# Patient Record
Sex: FEMALE
[De-identification: names, ages, dates, MRNs, and addresses within clinical notes are randomized per-mention and may not be internally consistent; named-entity substitution may affect disease eponyms.]

---

## 2017-02-01 ENCOUNTER — LAB SERVICES (OUTPATIENT)
Dept: OTHER | Age: 47
End: 2017-02-01

## 2017-02-01 ENCOUNTER — CHARTING TRANS (OUTPATIENT)
Dept: OTHER | Age: 47
End: 2017-02-01

## 2017-02-01 LAB
APPEARANCE: NORMAL
BILIRUBIN: NEGATIVE
COLOR: YELLOW
GLUCOSE U: NEGATIVE
KETONES: NEGATIVE
LEUKOCYTE ESTERASE: NORMAL
LEUKOCYTES: NORMAL
NITRITE: NEGATIVE
OCCULT BLOOD: NORMAL
PH: 6
PROTEIN: NEGATIVE
URINE SPEC GRAVITY: 1
UROBILINOGEN: 0.2

## 2017-02-06 LAB — BACTERIA UR CULT: NORMAL

## 2017-07-24 ENCOUNTER — OFFICE VISIT (OUTPATIENT)
Dept: OBGYN CLINIC | Facility: CLINIC | Age: 47
End: 2017-07-24

## 2017-07-24 VITALS
SYSTOLIC BLOOD PRESSURE: 112 MMHG | DIASTOLIC BLOOD PRESSURE: 74 MMHG | WEIGHT: 152 LBS | BODY MASS INDEX: 23.86 KG/M2 | HEIGHT: 67 IN

## 2017-07-24 DIAGNOSIS — Z01.419 WOMEN'S ANNUAL ROUTINE GYNECOLOGICAL EXAMINATION: Primary | ICD-10-CM

## 2017-07-24 PROBLEM — Z86.010 HISTORY OF COLON POLYPS: Status: ACTIVE | Noted: 2017-07-24

## 2017-07-24 PROBLEM — Z86.0100 HISTORY OF COLON POLYPS: Status: ACTIVE | Noted: 2017-07-24

## 2017-07-24 PROCEDURE — 88175 CYTOPATH C/V AUTO FLUID REDO: CPT | Performed by: OBSTETRICS & GYNECOLOGY

## 2017-07-24 PROCEDURE — 87624 HPV HI-RISK TYP POOLED RSLT: CPT | Performed by: OBSTETRICS & GYNECOLOGY

## 2017-07-24 PROCEDURE — 99396 PREV VISIT EST AGE 40-64: CPT | Performed by: OBSTETRICS & GYNECOLOGY

## 2017-07-24 RX ORDER — LEVOTHYROXINE, LIOTHYRONINE 19; 4.5 UG/1; UG/1
TABLET ORAL
Refills: 1 | COMMUNITY
Start: 2017-06-26 | End: 2018-02-09

## 2017-07-24 NOTE — PROGRESS NOTES
Renny Pee is here for a checkup. I have not seen her since . She is 41-year-old  4 para . Patient says that her periods are very regular at 24 days apart lasting for 4 days.   Her last Pap smear in  was normal she never had abnormal Pap history.     Social History       Social History  Social History   Marital status:   Spouse name: N/A    Years of education: N/A  Number of children: N/A     Occupational History  None on file     Social History Main Topics   Smoking status: Never Sm

## 2017-07-25 LAB — HPV I/H RISK 1 DNA SPEC QL NAA+PROBE: NEGATIVE

## 2017-09-22 ENCOUNTER — TELEPHONE (OUTPATIENT)
Dept: OBGYN CLINIC | Facility: CLINIC | Age: 47
End: 2017-09-22

## 2017-09-22 DIAGNOSIS — R92.8 ABNORMAL MAMMOGRAM: Primary | ICD-10-CM

## 2017-09-22 NOTE — TELEPHONE ENCOUNTER
Mammogram screening report stated that pt with calcifications in right breast and additional imaging is necessary. Order sent for diagnostic mammo of right breast to  breast center.

## 2017-10-03 ENCOUNTER — TELEPHONE (OUTPATIENT)
Dept: OBGYN CLINIC | Facility: CLINIC | Age: 47
End: 2017-10-03

## 2017-10-03 NOTE — TELEPHONE ENCOUNTER
LM for pt to contact office re: diagnostic mammogram results. Pt with some calcifications and 6 month f/u mammogram recommended with magnification views.

## 2017-10-03 NOTE — TELEPHONE ENCOUNTER
Spoke to pt and let her know that 6 month f/u mammogram is necessary for calcifications found. She voiced understanding. Placed in [address] file.

## 2018-01-12 ENCOUNTER — OFFICE VISIT (OUTPATIENT)
Dept: FAMILY MEDICINE CLINIC | Facility: CLINIC | Age: 48
End: 2018-01-12

## 2018-01-12 VITALS
TEMPERATURE: 98 F | WEIGHT: 149 LBS | OXYGEN SATURATION: 98 % | HEART RATE: 76 BPM | DIASTOLIC BLOOD PRESSURE: 78 MMHG | HEIGHT: 66 IN | SYSTOLIC BLOOD PRESSURE: 98 MMHG | BODY MASS INDEX: 23.95 KG/M2 | RESPIRATION RATE: 17 BRPM

## 2018-01-12 DIAGNOSIS — Z80.0 FAMILY HISTORY OF COLON CANCER: ICD-10-CM

## 2018-01-12 DIAGNOSIS — E01.0 THYROMEGALY: ICD-10-CM

## 2018-01-12 DIAGNOSIS — Z86.010 HISTORY OF COLON POLYPS: ICD-10-CM

## 2018-01-12 DIAGNOSIS — E06.3 HASHIMOTO'S THYROIDITIS: ICD-10-CM

## 2018-01-12 DIAGNOSIS — Z00.00 ROUTINE MEDICAL EXAM: Primary | ICD-10-CM

## 2018-01-12 PROCEDURE — 99396 PREV VISIT EST AGE 40-64: CPT | Performed by: FAMILY MEDICINE

## 2018-01-16 ENCOUNTER — MED REC SCAN ONLY (OUTPATIENT)
Dept: FAMILY MEDICINE CLINIC | Facility: CLINIC | Age: 48
End: 2018-01-16

## 2018-01-17 ENCOUNTER — HOSPITAL ENCOUNTER (OUTPATIENT)
Dept: ULTRASOUND IMAGING | Age: 48
Discharge: HOME | End: 2018-01-17
Attending: FAMILY MEDICINE
Payer: COMMERCIAL

## 2018-01-17 ENCOUNTER — LAB ENCOUNTER (OUTPATIENT)
Dept: LAB | Facility: REFERENCE LAB | Age: 48
End: 2018-01-17
Attending: FAMILY MEDICINE
Payer: COMMERCIAL

## 2018-01-17 DIAGNOSIS — E01.0 THYROMEGALY: ICD-10-CM

## 2018-01-17 DIAGNOSIS — E06.3 HASHIMOTO'S THYROIDITIS: ICD-10-CM

## 2018-01-17 DIAGNOSIS — Z00.00 ROUTINE MEDICAL EXAM: ICD-10-CM

## 2018-01-17 LAB
ALBUMIN SERPL BCP-MCNC: 4.2 G/DL (ref 3.5–4.8)
ALBUMIN/GLOB SERPL: 1.5 {RATIO} (ref 1–2)
ALP SERPL-CCNC: 40 U/L (ref 32–100)
ALT SERPL-CCNC: 16 U/L (ref 14–54)
ANION GAP SERPL CALC-SCNC: 9 MMOL/L (ref 0–18)
AST SERPL-CCNC: 23 U/L (ref 15–41)
BASOPHILS # BLD: 0 K/UL (ref 0–0.2)
BASOPHILS NFR BLD: 1 %
BILIRUB SERPL-MCNC: 1 MG/DL (ref 0.3–1.2)
BUN SERPL-MCNC: 16 MG/DL (ref 8–20)
BUN/CREAT SERPL: 20 (ref 10–20)
CALCIUM SERPL-MCNC: 9.3 MG/DL (ref 8.5–10.5)
CHLORIDE SERPL-SCNC: 105 MMOL/L (ref 95–110)
CHOLEST SERPL-MCNC: 156 MG/DL (ref 110–200)
CO2 SERPL-SCNC: 24 MMOL/L (ref 22–32)
CREAT SERPL-MCNC: 0.8 MG/DL (ref 0.5–1.5)
DHEA-S SERPL-MCNC: 146.4 UG/DL (ref 20–266)
EOSINOPHIL # BLD: 0.1 K/UL (ref 0–0.7)
EOSINOPHIL NFR BLD: 3 %
ERYTHROCYTE [DISTWIDTH] IN BLOOD BY AUTOMATED COUNT: 13.6 % (ref 11–15)
FOLATE SERPL-MCNC: 20.1 NG/ML
GLOBULIN PLAS-MCNC: 2.8 G/DL (ref 2.5–3.7)
GLUCOSE SERPL-MCNC: 87 MG/DL (ref 70–99)
HBA1C MFR BLD: 5.3 % (ref 4–6)
HCT VFR BLD AUTO: 43.6 % (ref 35–48)
HDLC SERPL-MCNC: 79 MG/DL
HGB BLD-MCNC: 14.2 G/DL (ref 12–16)
LDLC SERPL CALC-MCNC: 70 MG/DL (ref 0–99)
LYMPHOCYTES # BLD: 1.2 K/UL (ref 1–4)
LYMPHOCYTES NFR BLD: 27 %
MAGNESIUM SERPL-MCNC: 2 MG/DL (ref 1.8–2.5)
MCH RBC QN AUTO: 30 PG (ref 27–32)
MCHC RBC AUTO-ENTMCNC: 32.6 G/DL (ref 32–37)
MCV RBC AUTO: 92 FL (ref 80–100)
MONOCYTES # BLD: 0.3 K/UL (ref 0–1)
MONOCYTES NFR BLD: 7 %
NEUTROPHILS # BLD AUTO: 3 K/UL (ref 1.8–7.7)
NEUTROPHILS NFR BLD: 63 %
NONHDLC SERPL-MCNC: 77 MG/DL
OSMOLALITY UR CALC.SUM OF ELEC: 287 MOSM/KG (ref 275–295)
PLATELET # BLD AUTO: 197 K/UL (ref 140–400)
PMV BLD AUTO: 8.6 FL (ref 7.4–10.3)
POTASSIUM SERPL-SCNC: 4.1 MMOL/L (ref 3.3–5.1)
PROT SERPL-MCNC: 7 G/DL (ref 5.9–8.4)
RBC # BLD AUTO: 4.74 M/UL (ref 3.7–5.4)
SODIUM SERPL-SCNC: 138 MMOL/L (ref 136–144)
T3FREE SERPL-MCNC: 3.43 PG/ML (ref 2.53–4.29)
T4 FREE SERPL-MCNC: 0.78 NG/DL (ref 0.58–1.64)
THYROPEROXIDASE AB SERPL-ACNC: 1608 IU/ML (ref 0–9)
TRIGL SERPL-MCNC: 34 MG/DL (ref 1–149)
TSH SERPL-ACNC: 1.56 UIU/ML (ref 0.45–5.33)
VIT B12 SERPL-MCNC: 815 PG/ML (ref 181–914)
WBC # BLD AUTO: 4.7 K/UL (ref 4–11)

## 2018-01-17 PROCEDURE — 82306 VITAMIN D 25 HYDROXY: CPT | Performed by: FAMILY MEDICINE

## 2018-01-17 PROCEDURE — 84630 ASSAY OF ZINC: CPT | Performed by: FAMILY MEDICINE

## 2018-01-17 PROCEDURE — 84481 FREE ASSAY (FT-3): CPT | Performed by: FAMILY MEDICINE

## 2018-01-17 PROCEDURE — 84207 ASSAY OF VITAMIN B-6: CPT | Performed by: FAMILY MEDICINE

## 2018-01-17 PROCEDURE — 86376 MICROSOMAL ANTIBODY EACH: CPT | Performed by: FAMILY MEDICINE

## 2018-01-17 PROCEDURE — 86800 THYROGLOBULIN ANTIBODY: CPT | Performed by: FAMILY MEDICINE

## 2018-01-17 PROCEDURE — 86628 CANDIDA ANTIBODY: CPT | Performed by: FAMILY MEDICINE

## 2018-01-17 PROCEDURE — 80050 GENERAL HEALTH PANEL: CPT | Performed by: FAMILY MEDICINE

## 2018-01-17 PROCEDURE — 83735 ASSAY OF MAGNESIUM: CPT | Performed by: FAMILY MEDICINE

## 2018-01-17 PROCEDURE — 82607 VITAMIN B-12: CPT | Performed by: FAMILY MEDICINE

## 2018-01-17 PROCEDURE — 80061 LIPID PANEL: CPT | Performed by: FAMILY MEDICINE

## 2018-01-17 PROCEDURE — 76536 US EXAM OF HEAD AND NECK: CPT | Performed by: FAMILY MEDICINE

## 2018-01-17 PROCEDURE — 84439 ASSAY OF FREE THYROXINE: CPT | Performed by: FAMILY MEDICINE

## 2018-01-17 PROCEDURE — 82746 ASSAY OF FOLIC ACID SERUM: CPT | Performed by: FAMILY MEDICINE

## 2018-01-17 PROCEDURE — 83036 HEMOGLOBIN GLYCOSYLATED A1C: CPT | Performed by: FAMILY MEDICINE

## 2018-01-17 PROCEDURE — 84482 T3 REVERSE: CPT | Performed by: FAMILY MEDICINE

## 2018-01-17 PROCEDURE — 82627 DEHYDROEPIANDROSTERONE: CPT | Performed by: FAMILY MEDICINE

## 2018-01-17 PROCEDURE — 36415 COLL VENOUS BLD VENIPUNCTURE: CPT | Performed by: FAMILY MEDICINE

## 2018-01-18 LAB — THYROGLOBULIN AB: 84.8 IU/ML

## 2018-01-19 LAB
25(OH)D3 SERPL-MCNC: 46.3 NG/ML
ZINC: 81 UG/DL

## 2018-01-20 LAB — VITAMIN B6: 58.8 NMOL/L

## 2018-01-21 LAB
CANDIDA ANTIBODY IGA: 0.89 EV
CANDIDA ANTIBODY IGG: 0.62 EV
CANDIDA ANTIBODY IGM: 0.91 EV

## 2018-02-01 LAB — TRIIODOTHYRONINE, REVERSE: 12.4 NG/DL

## 2018-02-09 ENCOUNTER — OFFICE VISIT (OUTPATIENT)
Dept: FAMILY MEDICINE CLINIC | Facility: CLINIC | Age: 48
End: 2018-02-09

## 2018-02-09 VITALS
SYSTOLIC BLOOD PRESSURE: 98 MMHG | HEART RATE: 87 BPM | DIASTOLIC BLOOD PRESSURE: 60 MMHG | HEIGHT: 66 IN | OXYGEN SATURATION: 97 % | TEMPERATURE: 98 F | WEIGHT: 146 LBS | RESPIRATION RATE: 16 BRPM | BODY MASS INDEX: 23.46 KG/M2

## 2018-02-09 DIAGNOSIS — E06.3 HASHIMOTO'S THYROIDITIS: Primary | ICD-10-CM

## 2018-02-09 DIAGNOSIS — B37.9 CANDIDIASIS: ICD-10-CM

## 2018-02-09 PROCEDURE — 99214 OFFICE O/P EST MOD 30 MIN: CPT | Performed by: FAMILY MEDICINE

## 2018-02-09 RX ORDER — LEVOTHYROXINE AND LIOTHYRONINE 38; 9 UG/1; UG/1
120 TABLET ORAL DAILY
Qty: 180 TABLET | Refills: 0 | Status: SHIPPED | OUTPATIENT
Start: 2018-02-09 | End: 2018-05-07

## 2018-02-09 NOTE — PATIENT INSTRUCTIONS
The products and items listed below (the “Products”)  and their claims have not been evaluated by the Food and Drug Administration. Dietary products are not intended to treat, prevent, mitigate or cure disease.  Ultimately, you must draw your own conclusion 1. Start by looking at your diet. Feel free to slowly make changes to the diet, for example making a significant change in the diet every week or two. Take on one meal type or one food type at a time.  The ultimate goal is to eat 80-90% of the food from the Description: NOW® Caprylic Acid is a naturally derived nutrient also known as octanoic acid. Caprylic Acid is a medium chain fatty acid (MCT) that is naturally found in coconut and palm kernel oil.   Caprylic Acid may contribute to support a healthy digest Milk  Cream  Whey isolate  MOLDY NUTS & SEEDS  Peanuts  Cashews  Pistachios  CONDIMENTS  Barbecue sauce  Horseradish  Ketchup  Mayonnaise  Soy sauce  White vinegar  REFINED/PROCESSED FATS & OILS  Canola oil  Fake ‘butter’ spreads  Margarine  Soybean oil  S These are all very nutritious fruits that contain very few natural sugars. When buying olives, be sure to buy the properly fermented kind, not the ones that are simply pickled in white vinegar.   Grains And Pseudo-Grains  Grains and pseudo-grains like buckw Nuts like almond and hazelnut are healthy and have a low mold content. Any residual mold can be removed by soaking them in a diluted grapefruit seed extract solution for a few hours.  Coconut is always a great option on the Candida diet, whether is comes in There are lots of herbal teas that have antifungal properties, and in fact you can drink any herbal tea on the Candida diet. Just make sure that it is not caffeinated.   Chicory root is also a great prebiotic (it contains 20% Inulin), so it can help to repo You should aim to eliminate caffeine completely, but there are other options if that’s proving impossible for you. Decaf coffee still contains small amounts of caffeine, but look for decaf that is made by the Swiss water process.  Green tea is also lower in Many people don’t realize that the grain varieties we eat today are very different from those that our ancestors enjoyed. Today’s varieties have increased yields, but they also have much more gluten than was present before.  Our digestive systems are simply Whey isolate is another dairy food that should be avoided. The processing used to produce whey isolates produces damaging D optical isomers, which are not designed to be in your body.  When these D proteins end up in your bone, brain and muscle, they have b Refined and processed vegetable oils are generally bad news for your health, and you should avoid them while on this anti-Candida diet. There are plenty of excellent oils to choose from, without resorting to these over-processed vegetable derivatives.  Also I already mentioned fruit juices in the fruits section – these should be avoided because of the way that they affect your blood sugar. Also steer yourself away from sugary soft drinks, and diet drinks that contain aspartame or sucralose.  Energy drinks tend

## 2018-02-09 NOTE — PROGRESS NOTES
Clearance Led is a 52year old female. Patient presents with:  Lab Results      HPI:   Here for follow up. Has started the Restore and doing well so far. Taking her NP thyroid 2 pills twice daily.   Feels worse over the past week because she ran Temp: 98 °F (36.7 °C)   TempSrc: Oral   SpO2: 97%   Weight: 146 lb   Height: 66\"       Physical Exam   Constitutional: She is oriented to person, place, and time and well-developed, well-nourished, and in no distress.    HENT:   Head: Normocephalic and atr The products and items listed below (the “Products”)  and their claims have not been evaluated by the Food and Drug Administration. Dietary products are not intended to treat, prevent, mitigate or cure disease.  Ultimately, you must draw your own conclusion 1. Start by looking at your diet. Feel free to slowly make changes to the diet, for example making a significant change in the diet every week or two. Take on one meal type or one food type at a time.  The ultimate goal is to eat 80-90% of the food from the Description: NOW® Caprylic Acid is a naturally derived nutrient also known as octanoic acid. Caprylic Acid is a medium chain fatty acid (MCT) that is naturally found in coconut and palm kernel oil.   Caprylic Acid may contribute to support a healthy digest Milk  Cream  Whey isolate  MOLDY NUTS & SEEDS  Peanuts  Cashews  Pistachios  CONDIMENTS  Barbecue sauce  Horseradish  Ketchup  Mayonnaise  Soy sauce  White vinegar  REFINED/PROCESSED FATS & OILS  Canola oil  Fake ‘butter’ spreads  Margarine  Soybean oil  S These are all very nutritious fruits that contain very few natural sugars. When buying olives, be sure to buy the properly fermented kind, not the ones that are simply pickled in white vinegar.   Grains And Pseudo-Grains  Grains and pseudo-grains like buckw Nuts like almond and hazelnut are healthy and have a low mold content. Any residual mold can be removed by soaking them in a diluted grapefruit seed extract solution for a few hours.  Coconut is always a great option on the Candida diet, whether is comes in There are lots of herbal teas that have antifungal properties, and in fact you can drink any herbal tea on the Candida diet. Just make sure that it is not caffeinated.   Chicory root is also a great prebiotic (it contains 20% Inulin), so it can help to repo You should aim to eliminate caffeine completely, but there are other options if that’s proving impossible for you. Decaf coffee still contains small amounts of caffeine, but look for decaf that is made by the Swiss water process.  Green tea is also lower in Many people don’t realize that the grain varieties we eat today are very different from those that our ancestors enjoyed. Today’s varieties have increased yields, but they also have much more gluten than was present before.  Our digestive systems are simply Whey isolate is another dairy food that should be avoided. The processing used to produce whey isolates produces damaging D optical isomers, which are not designed to be in your body.  When these D proteins end up in your bone, brain and muscle, they have b Refined and processed vegetable oils are generally bad news for your health, and you should avoid them while on this anti-Candida diet. There are plenty of excellent oils to choose from, without resorting to these over-processed vegetable derivatives.  Also I already mentioned fruit juices in the fruits section – these should be avoided because of the way that they affect your blood sugar. Also steer yourself away from sugary soft drinks, and diet drinks that contain aspartame or sucralose.  Energy drinks tend

## 2018-02-19 ENCOUNTER — TELEPHONE (OUTPATIENT)
Dept: OBGYN CLINIC | Facility: CLINIC | Age: 48
End: 2018-02-19

## 2018-02-19 DIAGNOSIS — R92.1 BREAST CALCIFICATIONS: Primary | ICD-10-CM

## 2018-02-19 NOTE — TELEPHONE ENCOUNTER
Per sony, pt is due for 6 month f/u mammogram for breast calcifications. Order placed and faxed to  breast center. LM for pt on her personal VM.

## 2018-02-21 DIAGNOSIS — R92.1 BREAST CALCIFICATION SEEN ON MAMMOGRAM: Primary | ICD-10-CM

## 2018-02-21 NOTE — PROGRESS NOTES
Orders done in [facility] and faxed to [facility] @ [phone]

## 2018-05-07 RX ORDER — LEVOTHYROXINE, LIOTHYRONINE 38; 9 UG/1; UG/1
120 TABLET ORAL DAILY
Qty: 180 TABLET | Refills: 0 | Status: SHIPPED | OUTPATIENT
Start: 2018-05-07 | End: 2018-08-30

## 2018-08-22 ENCOUNTER — TELEPHONE (OUTPATIENT)
Dept: OBGYN CLINIC | Facility: CLINIC | Age: 48
End: 2018-08-22

## 2018-08-22 ENCOUNTER — TELEPHONE (OUTPATIENT)
Dept: FAMILY MEDICINE CLINIC | Facility: CLINIC | Age: 48
End: 2018-08-22

## 2018-08-22 DIAGNOSIS — E06.3 HASHIMOTO'S THYROIDITIS: Primary | ICD-10-CM

## 2018-08-22 DIAGNOSIS — R92.1 BREAST CALCIFICATIONS: Primary | ICD-10-CM

## 2018-08-23 NOTE — TELEPHONE ENCOUNTER
I am putting the orders in  I have a 3pm in [facility] available on September 4th if she would like to get in sooner to discuss this.

## 2018-08-23 NOTE — TELEPHONE ENCOUNTER
She is due for a f/u office visit. Please call to schedule and I will have those put in to get done prior to her visit.

## 2018-08-23 NOTE — TELEPHONE ENCOUNTER
Fatigue can be caused by a multitude of factors so I prefer to have follow up so we discuss thoroughly and evaluate all aspects that may be contributing to this. Thyroid lab orders are in.

## 2018-08-27 ENCOUNTER — TELEPHONE (OUTPATIENT)
Dept: FAMILY MEDICINE CLINIC | Facility: CLINIC | Age: 48
End: 2018-08-27

## 2018-08-27 NOTE — TELEPHONE ENCOUNTER
Spoke to pt and she stated that she needs a refill on her NP Thyroid 60mg, 2 tabs daily. Let pt know that msg will be sent to Dr. Colonel Chambers for ok. Pt with f/u appt on 10/5/18.

## 2018-08-29 RX ORDER — THYROID 60 MG/1
120 TABLET ORAL DAILY
Qty: 180 TABLET | Refills: 0 | Status: CANCELLED
Start: 2018-08-29

## 2018-08-30 ENCOUNTER — TELEPHONE (OUTPATIENT)
Dept: OBGYN CLINIC | Facility: CLINIC | Age: 48
End: 2018-08-30

## 2018-08-30 NOTE — TELEPHONE ENCOUNTER
Spoke with pt and refilled pt's med.  Pt stated initially she was planning on having thyroid panel drawn prior to med refill but pt states she did not see orders in her mychart and pt didn't receive call back that labs were ordered nor that she wasn't able

## 2018-08-30 NOTE — TELEPHONE ENCOUNTER
Please look in her chart. All the labs are ordered and ready for her to do and you can let her know that this can be done at any time.

## 2018-09-03 NOTE — TELEPHONE ENCOUNTER
From: Edward Botello  Sent: 8/29/2018 4:30 PM CDT  Subject: Medication Renewal Request    Brigido Botello would like a refill of the following medications:     NP THYROID 60 MG Oral Tab Kin DO Kely]    Preferred pharmacy: [address] [phone], [phone]

## 2018-09-07 ENCOUNTER — TELEPHONE (OUTPATIENT)
Dept: OBGYN CLINIC | Facility: CLINIC | Age: 48
End: 2018-09-07

## 2018-09-17 ENCOUNTER — TELEPHONE (OUTPATIENT)
Dept: OBGYN CLINIC | Facility: CLINIC | Age: 48
End: 2018-09-17

## 2018-09-17 DIAGNOSIS — R92.1 BREAST CALCIFICATION SEEN ON MAMMOGRAM: Primary | ICD-10-CM

## 2018-09-17 NOTE — TELEPHONE ENCOUNTER
Fax rec'd from  breast center that pt requires a right stereotactic breast biopsy d/t calcifications. Order placed and sent to . Pt with appt on 9/19/18.

## 2018-09-18 ENCOUNTER — TELEPHONE (OUTPATIENT)
Dept: OBGYN CLINIC | Facility: CLINIC | Age: 48
End: 2018-09-18

## 2018-09-18 NOTE — TELEPHONE ENCOUNTER
Mandie Rodriguez had mammogram at [facility] breast center on September 14. Reports shows that she has grouping of microcalcification in the upper outer quadrant of the right breast which require biopsy.     I discussed this with the patient and she already has

## 2018-09-21 ENCOUNTER — TELEPHONE (OUTPATIENT)
Dept: OBGYN CLINIC | Facility: CLINIC | Age: 48
End: 2018-09-21

## 2018-09-27 ENCOUNTER — APPOINTMENT (OUTPATIENT)
Dept: LAB | Facility: REFERENCE LAB | Age: 48
End: 2018-09-27
Attending: FAMILY MEDICINE
Payer: COMMERCIAL

## 2018-09-27 DIAGNOSIS — E06.3 HASHIMOTO'S THYROIDITIS: ICD-10-CM

## 2018-09-27 LAB
T3FREE SERPL-MCNC: 3.36 PG/ML (ref 2.53–4.29)
T4 FREE SERPL-MCNC: 0.65 NG/DL (ref 0.58–1.64)
TSH SERPL-ACNC: 5.34 UIU/ML (ref 0.45–5.33)

## 2018-09-27 PROCEDURE — 84481 FREE ASSAY (FT-3): CPT | Performed by: FAMILY MEDICINE

## 2018-09-27 PROCEDURE — 86800 THYROGLOBULIN ANTIBODY: CPT | Performed by: FAMILY MEDICINE

## 2018-09-27 PROCEDURE — 84482 T3 REVERSE: CPT | Performed by: FAMILY MEDICINE

## 2018-09-27 PROCEDURE — 36415 COLL VENOUS BLD VENIPUNCTURE: CPT | Performed by: FAMILY MEDICINE

## 2018-09-27 PROCEDURE — 84443 ASSAY THYROID STIM HORMONE: CPT | Performed by: FAMILY MEDICINE

## 2018-09-27 PROCEDURE — 84439 ASSAY OF FREE THYROXINE: CPT | Performed by: FAMILY MEDICINE

## 2018-09-27 PROCEDURE — 86376 MICROSOMAL ANTIBODY EACH: CPT | Performed by: FAMILY MEDICINE

## 2018-09-28 LAB
THYROGLOBULIN AB: 206.2 IU/ML
THYROPEROXIDASE AB SERPL-ACNC: 942.5 IU/ML (ref 0–9)

## 2018-09-30 LAB — TRIIODOTHYRONINE, REVERSE: 10.1 NG/DL

## 2018-10-02 ENCOUNTER — OFFICE VISIT (OUTPATIENT)
Dept: FAMILY MEDICINE CLINIC | Facility: CLINIC | Age: 48
End: 2018-10-02
Payer: COMMERCIAL

## 2018-10-02 ENCOUNTER — APPOINTMENT (OUTPATIENT)
Dept: LAB | Facility: REFERENCE LAB | Age: 48
End: 2018-10-02
Attending: FAMILY MEDICINE
Payer: COMMERCIAL

## 2018-10-02 VITALS
HEIGHT: 66 IN | DIASTOLIC BLOOD PRESSURE: 70 MMHG | BODY MASS INDEX: 24.11 KG/M2 | OXYGEN SATURATION: 98 % | WEIGHT: 150 LBS | SYSTOLIC BLOOD PRESSURE: 100 MMHG | HEART RATE: 76 BPM

## 2018-10-02 DIAGNOSIS — B37.9 CANDIDIASIS: ICD-10-CM

## 2018-10-02 DIAGNOSIS — R53.82 CHRONIC FATIGUE: ICD-10-CM

## 2018-10-02 DIAGNOSIS — E01.0 THYROMEGALY: ICD-10-CM

## 2018-10-02 DIAGNOSIS — E06.3 HASHIMOTO'S THYROIDITIS: Primary | ICD-10-CM

## 2018-10-02 PROCEDURE — 36415 COLL VENOUS BLD VENIPUNCTURE: CPT | Performed by: FAMILY MEDICINE

## 2018-10-02 PROCEDURE — 82627 DEHYDROEPIANDROSTERONE: CPT | Performed by: FAMILY MEDICINE

## 2018-10-02 PROCEDURE — 99214 OFFICE O/P EST MOD 30 MIN: CPT | Performed by: FAMILY MEDICINE

## 2018-10-02 PROCEDURE — 82728 ASSAY OF FERRITIN: CPT | Performed by: FAMILY MEDICINE

## 2018-10-02 PROCEDURE — 84140 ASSAY OF PREGNENOLONE: CPT | Performed by: FAMILY MEDICINE

## 2018-10-02 NOTE — PROGRESS NOTES
Agus Hannon is a 50year old female. Patient presents with:   Follow - Up: tired, med check      HPI:     Feeling very tired - started over the summer  Feeling like she needs more coffee  Drinking 24 oz of coffee per day  Sleep is erratic - will ha Substance and Sexual Activity      Alcohol use:  Yes        Alcohol/week: 0.6 oz        Types: 1 Standard drinks or equivalent per week        Comment: 1 qd      Drug use: Yes        Types: Cannabis      Sexual activity: Not on file    Other Topics      C The course of treatment for improving the balance of her microflora and to reduce the candida load in the system was discussed in full, including that this process takes at least 4-6 months or longer.  The patient was given a detailed explanation of the die Description: NOW® Caprylic Acid is a naturally derived nutrient also known as octanoic acid. Caprylic Acid is a medium chain fatty acid (MCT) that is naturally found in coconut and palm kernel oil.   Caprylic Acid may contribute to support a healthy digest · Just BREATHE! One of the easiest practices to do is to sit and pay attention to your breath. You can literally say to yourself, “I am breathing in” as you breathe in and “I am breathing out” as you breathe out. Use these phrases to focus your mind.    · Y [facility] one word to repeat out loud at first, then quieter and quieter until only mouthing the word. Then say quietly inside your head and then allow this word to pull you into complete relaxation.     Lea - Keisha Villar or

## 2018-10-02 NOTE — PATIENT INSTRUCTIONS
The products and items listed below (the “Products”)  and their claims have not been evaluated by the Food and Drug Administration. Dietary products are not intended to treat, prevent, mitigate or cure disease.  Ultimately, you must draw your own conclusion Where to buy: http://www.[employer]/  Directions: 1 capsule daily    Xymogen Brand Supplements  To sign up with [employer] in order to purchase the recommended products go to:     www. [employer]    Click on orange link in left upperhand · Gratitude can instill a sense of peace. Keep a gratitude journal and write down at least 5 things (or more!) that you are thankful for everyday. Life never seems as bad when you realize how much you have to be thankful for.    · Focus on positive things Conscious Breathing: Breathwork for Health, Stress Release, and Personal Mastery by Naveed Franco    The Miracle of Mindfulness by Boubacar Houston    Check out these resources on how meditation and deep breathing can transform your mind and body:    http:/

## 2018-10-31 ENCOUNTER — OFFICE VISIT (OUTPATIENT)
Dept: FAMILY MEDICINE CLINIC | Facility: CLINIC | Age: 48
End: 2018-10-31
Payer: COMMERCIAL

## 2018-10-31 VITALS
SYSTOLIC BLOOD PRESSURE: 110 MMHG | BODY MASS INDEX: 24.91 KG/M2 | DIASTOLIC BLOOD PRESSURE: 60 MMHG | HEART RATE: 90 BPM | OXYGEN SATURATION: 98 % | WEIGHT: 155 LBS | HEIGHT: 66 IN

## 2018-10-31 DIAGNOSIS — E01.0 THYROMEGALY: ICD-10-CM

## 2018-10-31 DIAGNOSIS — E06.3 HASHIMOTO'S THYROIDITIS: Primary | ICD-10-CM

## 2018-10-31 DIAGNOSIS — R53.82 CHRONIC FATIGUE: ICD-10-CM

## 2018-10-31 DIAGNOSIS — S39.012A STRAIN OF LUMBAR REGION, INITIAL ENCOUNTER: ICD-10-CM

## 2018-10-31 PROCEDURE — 99214 OFFICE O/P EST MOD 30 MIN: CPT | Performed by: FAMILY MEDICINE

## 2018-10-31 RX ORDER — BACLOFEN 10 MG/1
10 TABLET ORAL 3 TIMES DAILY PRN
Qty: 30 TABLET | Refills: 0 | Status: SHIPPED | OUTPATIENT
Start: 2018-10-31 | End: 2021-06-28

## 2018-10-31 NOTE — PROGRESS NOTES
Alvaro Noguera is a 50year old female. Patient presents with: Follow - Up: lab results       HPI:     Threw her back out 2 weeks ago - slept wrong and lifted something  Doing acupuncture/cupping    Started vitamin C and iron - feels a bit better. Not on file    Tobacco Use      Smoking status: Never Smoker      Smokeless tobacco: Never Used    Substance and Sexual Activity      Alcohol use:  Yes        Alcohol/week: 0.6 oz        Types: 1 Standard drinks or equivalent per week        Comment: 1 Given further recommendations as below    Orders Placed This Visit:  Orders Placed This Encounter      TSH [E]      Thyroxine, Free [E]      Thyroid Peroxidase (TPO) AB [E]      Free T3 (Triiodothryronine)      LAB RESULT SCAN    No orders of the defined t Return in about 2 months (around 12/31/2018) for Integrative Medicine - Established (30 min). Patient affirmed understanding of plan and all questions were answered.      Meri Guillaume, DO

## 2018-11-05 VITALS
HEART RATE: 87 BPM | RESPIRATION RATE: 16 BRPM | BODY MASS INDEX: 21.69 KG/M2 | WEIGHT: 135 LBS | HEIGHT: 66 IN | TEMPERATURE: 98.4 F | DIASTOLIC BLOOD PRESSURE: 60 MMHG | SYSTOLIC BLOOD PRESSURE: 100 MMHG

## 2018-11-11 PROBLEM — E01.0 THYROMEGALY: Status: ACTIVE | Noted: 2018-11-11

## 2018-11-26 RX ORDER — LEVOTHYROXINE, LIOTHYRONINE 38; 9 UG/1; UG/1
TABLET ORAL
Qty: 180 TABLET | Refills: 0 | Status: SHIPPED | OUTPATIENT
Start: 2018-11-26 | End: 2019-02-23

## 2019-01-13 ENCOUNTER — HOSPITAL ENCOUNTER (OUTPATIENT)
Age: 49
Discharge: HOME | End: 2019-01-13
Attending: EMERGENCY MEDICINE
Payer: COMMERCIAL

## 2019-01-13 ENCOUNTER — APPOINTMENT (OUTPATIENT)
Dept: GENERAL RADIOLOGY | Age: 49
End: 2019-01-13
Attending: EMERGENCY MEDICINE
Payer: COMMERCIAL

## 2019-01-13 VITALS
TEMPERATURE: 99 F | OXYGEN SATURATION: 98 % | DIASTOLIC BLOOD PRESSURE: 66 MMHG | RESPIRATION RATE: 18 BRPM | HEART RATE: 78 BPM | SYSTOLIC BLOOD PRESSURE: 110 MMHG

## 2019-01-13 DIAGNOSIS — S92.515A NONDISPLACED FRACTURE OF PROXIMAL PHALANX OF LEFT LESSER TOE(S), INITIAL ENCOUNTER FOR CLOSED FRACTURE: Primary | ICD-10-CM

## 2019-01-13 PROCEDURE — 99203 OFFICE O/P NEW LOW 30 MIN: CPT

## 2019-01-13 PROCEDURE — 99213 OFFICE O/P EST LOW 20 MIN: CPT

## 2019-01-13 PROCEDURE — 73660 X-RAY EXAM OF TOE(S): CPT | Performed by: EMERGENCY MEDICINE

## 2019-01-13 NOTE — ED PROVIDER NOTES
[address]: [address]    History   Patient presents with:  Lower Extremity Injury (musculoskeletal)    Stated Complaint: Left Foot Toe Injury     HPI    27-year-old female, no significant past medical history excep dislocation. MDM   Left fourth toe injury, rule out fracture. + midshaft fx, no sig dislocation. D/W pt. Will buddy tape and pad, where wide shoe since she does not want a cast shoe here and follow-up with primary care physician as needed.   Tylenol

## 2019-01-13 NOTE — ED INITIAL ASSESSMENT (HPI)
Pt here with complaints of left 4th toe pain, pt states she hit her toe on the side of the door and thinks it may be broken, pt sates its hard to walk

## 2019-02-25 RX ORDER — LEVOTHYROXINE, LIOTHYRONINE 38; 9 UG/1; UG/1
TABLET ORAL
Qty: 180 TABLET | Refills: 0 | Status: SHIPPED | OUTPATIENT
Start: 2019-02-25 | End: 2019-05-23

## 2019-03-11 ENCOUNTER — TELEPHONE (OUTPATIENT)
Dept: OBGYN CLINIC | Facility: CLINIC | Age: 49
End: 2019-03-11

## 2019-03-11 DIAGNOSIS — Z12.39 BREAST CANCER SCREENING: Primary | ICD-10-CM

## 2019-03-11 NOTE — TELEPHONE ENCOUNTER
Orders for  6 month diagnostic mammogram    [facility]  [phone]

## 2019-03-14 ENCOUNTER — TELEPHONE (OUTPATIENT)
Dept: OBGYN CLINIC | Facility: CLINIC | Age: 49
End: 2019-03-14

## 2019-03-14 NOTE — TELEPHONE ENCOUNTER
Spoke to Carisa [facility] at [facility], who stated that per recommendation from her mammogram done 9/19/18, pt is to have right diag mammo done. Per Carisa Hernandez, she has an order that was faxed from our office that can still be used.

## 2019-03-14 NOTE — TELEPHONE ENCOUNTER
Haley farah from [facility] ([facility]) requesting an updated order for Right Diagnostic Mammogram with possible Ultrasound.  Please fax over to  [phone]

## 2019-05-22 NOTE — H&P
[address] [facility] [address] Gastroenterology                                                                                                  Clinic History and Physical     Pa disease     Gluten sensitivity,  never confirmed with test   • Colon polyp 2006   • Hemorrhoids    • Thyroid disease       Past Surgical History:   Procedure Laterality Date   • COLONOSCOPY  2010   • PATIENT DENIES ANY SURGICAL HISTORY        Family Hx: erythema  BEHAVIOR/PSYCH:  negative for psychotic behavior      PHYSICAL EXAM:   Blood pressure 113/77, pulse 77, height 5' 6\" (1.676 m), weight 152 lb (68.9 kg).     Gen: patient appears comfortable and in no acute distress  HEENT: conjunctiva pink, the s anti-coagulants: None  -Diabetes meds: None    ** If MAC @ [facility]/NE:    - HOLD ACE/ARBs the night before and/or the day of the procedure(s) - N/A   - NO alcohol, recreational drugs nor erectile dysfunction mediations 24 hours before procedure(s)   - NO herba

## 2019-05-23 RX ORDER — LEVOTHYROXINE AND LIOTHYRONINE 38; 9 UG/1; UG/1
TABLET ORAL
Qty: 180 TABLET | Refills: 0 | Status: SHIPPED | OUTPATIENT
Start: 2019-05-23 | End: 2019-05-31

## 2019-05-23 NOTE — TELEPHONE ENCOUNTER
Please call patient to let her know that the labs are all ordered and she can do them before her appointment. Please tell her to hold her thyroid dose the day of the labs. She can take it after her labwork is done that day.

## 2019-05-23 NOTE — TELEPHONE ENCOUNTER
A refill request was received for:  Requested Prescriptions      No prescriptions requested or ordered in this encounter     Last refill date: 2/25/2019  Qty:180 tabs   Last office visit: 10/31/2018  When is follow up due: 12/31/18    No future appt schedu

## 2019-05-23 NOTE — TELEPHONE ENCOUNTER
Called pt to let her know lab orders have been placed and medication was approved. Pt understands not to take medication before lab work.

## 2019-05-23 NOTE — TELEPHONE ENCOUNTER
appt for medication refill scheduled kurtis knox on 5 /31 at 10:00 am ...... Pt wants to know if you can order bloodwork prior to her appt so that Neha Qiu can go over results with her at appointment.

## 2019-05-28 ENCOUNTER — APPOINTMENT (OUTPATIENT)
Dept: LAB | Facility: REFERENCE LAB | Age: 49
End: 2019-05-28
Attending: FAMILY MEDICINE
Payer: COMMERCIAL

## 2019-05-28 DIAGNOSIS — E06.3 HASHIMOTO'S THYROIDITIS: ICD-10-CM

## 2019-05-28 PROCEDURE — 84443 ASSAY THYROID STIM HORMONE: CPT

## 2019-05-28 PROCEDURE — 86376 MICROSOMAL ANTIBODY EACH: CPT

## 2019-05-28 PROCEDURE — 84481 FREE ASSAY (FT-3): CPT

## 2019-05-28 PROCEDURE — 36415 COLL VENOUS BLD VENIPUNCTURE: CPT

## 2019-05-28 PROCEDURE — 84439 ASSAY OF FREE THYROXINE: CPT

## 2019-05-29 ENCOUNTER — TELEPHONE (OUTPATIENT)
Dept: GASTROENTEROLOGY | Facility: CLINIC | Age: 49
End: 2019-05-29

## 2019-05-29 ENCOUNTER — OFFICE VISIT (OUTPATIENT)
Dept: GASTROENTEROLOGY | Facility: CLINIC | Age: 49
End: 2019-05-29
Payer: COMMERCIAL

## 2019-05-29 VITALS
BODY MASS INDEX: 24.43 KG/M2 | HEART RATE: 77 BPM | SYSTOLIC BLOOD PRESSURE: 113 MMHG | DIASTOLIC BLOOD PRESSURE: 77 MMHG | WEIGHT: 152 LBS | HEIGHT: 66 IN

## 2019-05-29 DIAGNOSIS — Z86.010 HISTORY OF COLON POLYPS: ICD-10-CM

## 2019-05-29 DIAGNOSIS — Z80.0 FAMILY HISTORY OF COLON CANCER: ICD-10-CM

## 2019-05-29 DIAGNOSIS — Z12.11 COLON CANCER SCREENING: Primary | ICD-10-CM

## 2019-05-29 DIAGNOSIS — Z86.010 HISTORY OF COLONIC POLYPS: ICD-10-CM

## 2019-05-29 DIAGNOSIS — Z12.11 SCREENING FOR COLON CANCER: Primary | ICD-10-CM

## 2019-05-29 PROCEDURE — 99212 OFFICE O/P EST SF 10 MIN: CPT | Performed by: NURSE PRACTITIONER

## 2019-05-29 PROCEDURE — S0285 CNSLT BEFORE SCREEN COLONOSC: HCPCS | Performed by: NURSE PRACTITIONER

## 2019-05-29 NOTE — TELEPHONE ENCOUNTER
Scheduled for:  Colonoscopy 12525  Provider Name: Dr. Maria Luz Hampton  Date:  8/13/19  Location:  [address] [place] [address]  Sedation:  MAC  Time:   0900 (pt is aware to arrive at 0800)   Prep:  Colyte  Meds/Allergies Reconciled?:  Physician reviewed   Diagnosis with codes:  Colon cance

## 2019-05-29 NOTE — PATIENT INSTRUCTIONS
-Schedule colonoscopy w/ Dr. Demarco Baca or Dr. Derek Boyd with MAC   -Prep: Split dose Colyte or equivalent  -Anti-platelets and anti-coagulants: None  -Diabetes meds: None    ** If MAC @ [facility]/NE:    - HOLD ACE/ARBs the night before and/or the day of the proce

## 2019-05-31 ENCOUNTER — OFFICE VISIT (OUTPATIENT)
Dept: FAMILY MEDICINE CLINIC | Facility: CLINIC | Age: 49
End: 2019-05-31
Payer: COMMERCIAL

## 2019-05-31 VITALS
BODY MASS INDEX: 25.58 KG/M2 | HEIGHT: 66 IN | SYSTOLIC BLOOD PRESSURE: 116 MMHG | WEIGHT: 159.19 LBS | OXYGEN SATURATION: 98 % | TEMPERATURE: 99 F | DIASTOLIC BLOOD PRESSURE: 70 MMHG | HEART RATE: 93 BPM

## 2019-05-31 DIAGNOSIS — E06.3 HASHIMOTO'S THYROIDITIS: Primary | ICD-10-CM

## 2019-05-31 DIAGNOSIS — E01.0 THYROMEGALY: ICD-10-CM

## 2019-05-31 PROCEDURE — 99214 OFFICE O/P EST MOD 30 MIN: CPT | Performed by: PHYSICIAN ASSISTANT

## 2019-05-31 RX ORDER — LEVOTHYROXINE AND LIOTHYRONINE 38; 9 UG/1; UG/1
TABLET ORAL
Qty: 180 TABLET | Refills: 0 | Status: SHIPPED | OUTPATIENT
Start: 2019-05-31 | End: 2019-11-14

## 2019-05-31 NOTE — PROGRESS NOTES
Etelvina Lopez is a 50year old female. Patient presents with: Follow - Up: f/u on lab reults       HPI:   Gets nightmares from armor thyroid. Would like do not substitute to be written on proscription. Still tired and fatigued.  Trying to be brigitte baclofen 10 MG Oral Tab Take 1 tablet (10 mg total) by mouth 3 (three) times daily as needed (muscle spasm). Disp: 30 tablet Rfl: 0   Multiple Vitamins-Minerals (MULTIVITAL) Oral Tab Take 1 tablet by mouth daily.  Disp:  Rfl:    Cholecalciferol (VITAMIN D) Caffeine Concern: Not Asked        Exercise: Not Asked        Seat Belt: Not Asked        Special Diet: Not Asked        Stress Concern: Not Asked        Weight Concern: Not Asked    Social History Narrative      Not on file      SURGICAL HISTORY: - FREE T3 (TRIIODOTHYRONINE); Future  - ASSAY, THYROID STIM HORMONE; Future    Recommended nutrition counseling for patient. She states that she does not have time right now. Recommended patient cook more at home and focus on diet changes.      Lab orde

## 2019-05-31 NOTE — PATIENT INSTRUCTIONS
Try [employer]     Start taking vitamin D at least 5000 IU daily. Take 2 capsules 1-2 times daily. Find [email or web address]

## 2019-07-30 ENCOUNTER — TELEPHONE (OUTPATIENT)
Dept: INTEGRATIVE MEDICINE | Facility: CLINIC | Age: 49
End: 2019-07-30

## 2019-08-13 ENCOUNTER — ANESTHESIA (OUTPATIENT)
Dept: ENDOSCOPY | Age: 49
End: 2019-08-13
Payer: COMMERCIAL

## 2019-08-13 ENCOUNTER — HOSPITAL ENCOUNTER (OUTPATIENT)
Age: 49
Setting detail: HOSPITAL OUTPATIENT SURGERY
Discharge: HOME | End: 2019-08-13
Attending: INTERNAL MEDICINE | Admitting: INTERNAL MEDICINE
Payer: COMMERCIAL

## 2019-08-13 ENCOUNTER — ANESTHESIA EVENT (OUTPATIENT)
Dept: ENDOSCOPY | Age: 49
End: 2019-08-13
Payer: COMMERCIAL

## 2019-08-13 VITALS
RESPIRATION RATE: 12 BRPM | BODY MASS INDEX: 25.41 KG/M2 | DIASTOLIC BLOOD PRESSURE: 82 MMHG | HEIGHT: 66.5 IN | HEART RATE: 76 BPM | SYSTOLIC BLOOD PRESSURE: 99 MMHG | WEIGHT: 160 LBS | OXYGEN SATURATION: 100 %

## 2019-08-13 DIAGNOSIS — Z80.0 FAMILY HISTORY OF COLON CANCER: ICD-10-CM

## 2019-08-13 DIAGNOSIS — Z12.11 COLON CANCER SCREENING: ICD-10-CM

## 2019-08-13 DIAGNOSIS — Z86.010 HISTORY OF COLONIC POLYPS: ICD-10-CM

## 2019-08-13 PROCEDURE — 81025 URINE PREGNANCY TEST: CPT

## 2019-08-13 PROCEDURE — 99070 SPECIAL SUPPLIES PHYS/QHP: CPT | Performed by: INTERNAL MEDICINE

## 2019-08-13 PROCEDURE — 45380 COLONOSCOPY AND BIOPSY: CPT | Performed by: INTERNAL MEDICINE

## 2019-08-13 PROCEDURE — 88305 TISSUE EXAM BY PATHOLOGIST: CPT | Performed by: INTERNAL MEDICINE

## 2019-08-13 RX ORDER — LIDOCAINE HYDROCHLORIDE 10 MG/ML
INJECTION, SOLUTION EPIDURAL; INFILTRATION; INTRACAUDAL; PERINEURAL AS NEEDED
Status: DISCONTINUED | OUTPATIENT
Start: 2019-08-13 | End: 2019-08-13 | Stop reason: SURG

## 2019-08-13 RX ORDER — NALOXONE HYDROCHLORIDE 0.4 MG/ML
80 INJECTION, SOLUTION INTRAMUSCULAR; INTRAVENOUS; SUBCUTANEOUS AS NEEDED
Status: CANCELLED | OUTPATIENT
Start: 2019-08-13 | End: 2019-08-13

## 2019-08-13 RX ORDER — SODIUM CHLORIDE, SODIUM LACTATE, POTASSIUM CHLORIDE, CALCIUM CHLORIDE 600; 310; 30; 20 MG/100ML; MG/100ML; MG/100ML; MG/100ML
INJECTION, SOLUTION INTRAVENOUS CONTINUOUS
Status: CANCELLED | OUTPATIENT
Start: 2019-08-13

## 2019-08-13 RX ADMIN — LIDOCAINE HYDROCHLORIDE 40 MG: 10 INJECTION, SOLUTION EPIDURAL; INFILTRATION; INTRACAUDAL; PERINEURAL at 08:39:00

## 2019-08-13 NOTE — ANESTHESIA PREPROCEDURE EVALUATION
Anesthesia PreOp Note    HPI:     Agus Hannon is a 50year old female who presents for preoperative consultation requested by: Yesi Anderson MD    Date of Surgery: 8/13/2019    Procedure(s):  COLONOSCOPY  Indication: Colon cancer screening, His Other (alzheimers) Mother      Social History    Socioeconomic History      Marital status:       Spouse name: Not on file      Number of children: Not on file      Years of education: Not on file      Highest education level: Not on file    Occupat Signs:  There is no height or weight on file to calculate BMI.   vitals were not taken for this visit. There were no vitals filed for this visit.      Anesthesia Evaluation     Patient summary reviewed and Nursing notes reviewed    Airway   Mallampati: II

## 2019-08-13 NOTE — OPERATIVE REPORT
[facility] HOSP - [facility] Endoscopy Report      Preoperative Diagnosis:  - family history of colon cancer  - personal history of colon polyps      Postoperative Diagnosis:  - colon polyps x 2  - internal hemorrhoids      Procedure:    Colonoscopy       Lewis

## 2019-08-13 NOTE — ANESTHESIA POSTPROCEDURE EVALUATION
Patient: Shadi Mccann [facility]    Procedure Summary     Date:  08/13/19 Room / Location:  [facility] ENDOSCOPY 01 / [facility] ENDO    Anesthesia Start:  6413 Anesthesia Stop:  2199    Procedure:  COLONOSCOPY (N/A ) Diagnosis:       Colon cancer screening      History

## 2019-08-14 LAB — B-HCG UR QL: NEGATIVE

## 2019-08-14 NOTE — H&P
History & Physical Examination    Patient Name: Wolfgang Babin  MRN: [id number]  [facility]: [id number]  YOB: 1970    Diagnosis: history of colon polyps, family history of colon cancer        No medications prior to admission.     No current fac

## 2019-11-14 DIAGNOSIS — E06.3 HASHIMOTO'S THYROIDITIS: ICD-10-CM

## 2019-11-14 RX ORDER — LEVOTHYROXINE AND LIOTHYRONINE 38; 9 UG/1; UG/1
TABLET ORAL
Qty: 180 TABLET | Refills: 0 | Status: SHIPPED | OUTPATIENT
Start: 2019-11-14 | End: 2019-12-03

## 2019-11-14 NOTE — TELEPHONE ENCOUNTER
Patient scheduled for 11/19 with Belen Graft. She is requesting Thyroid levels be entered prior to the appointment so she can review them with the provider.  Thank you

## 2019-11-14 NOTE — TELEPHONE ENCOUNTER
Pharmacy calling, requests refill for pt's NP-Thyroid.    A refill request was received for:  Requested Prescriptions     Pending Prescriptions Disp Refills   • thyroid (NP THYROID) 60 MG Oral Tab 180 tablet 0     Sig: TAKE 2 TABLETS (120 MG TOTAL) BY MOUTH

## 2019-11-23 ENCOUNTER — APPOINTMENT (OUTPATIENT)
Dept: LAB | Age: 49
End: 2019-11-23
Attending: PHYSICIAN ASSISTANT
Payer: COMMERCIAL

## 2019-11-23 DIAGNOSIS — E01.0 THYROMEGALY: ICD-10-CM

## 2019-11-23 DIAGNOSIS — E06.3 HASHIMOTO'S THYROIDITIS: ICD-10-CM

## 2019-11-23 PROCEDURE — 36415 COLL VENOUS BLD VENIPUNCTURE: CPT

## 2019-11-23 PROCEDURE — 84481 FREE ASSAY (FT-3): CPT

## 2019-11-23 PROCEDURE — 86376 MICROSOMAL ANTIBODY EACH: CPT

## 2019-11-23 PROCEDURE — 84443 ASSAY THYROID STIM HORMONE: CPT

## 2019-11-23 PROCEDURE — 84439 ASSAY OF FREE THYROXINE: CPT

## 2019-12-03 ENCOUNTER — OFFICE VISIT (OUTPATIENT)
Dept: INTEGRATIVE MEDICINE | Facility: CLINIC | Age: 49
End: 2019-12-03
Payer: COMMERCIAL

## 2019-12-03 VITALS
BODY MASS INDEX: 25.73 KG/M2 | SYSTOLIC BLOOD PRESSURE: 104 MMHG | OXYGEN SATURATION: 95 % | HEART RATE: 74 BPM | TEMPERATURE: 98 F | HEIGHT: 66.5 IN | WEIGHT: 162 LBS | DIASTOLIC BLOOD PRESSURE: 66 MMHG

## 2019-12-03 DIAGNOSIS — Z12.31 SCREENING MAMMOGRAM, ENCOUNTER FOR: ICD-10-CM

## 2019-12-03 DIAGNOSIS — E06.3 HASHIMOTO'S THYROIDITIS: Primary | ICD-10-CM

## 2019-12-03 PROCEDURE — 99214 OFFICE O/P EST MOD 30 MIN: CPT | Performed by: PHYSICIAN ASSISTANT

## 2019-12-03 RX ORDER — LEVOTHYROXINE AND LIOTHYRONINE 38; 9 UG/1; UG/1
TABLET ORAL
Qty: 180 TABLET | Refills: 0 | Status: SHIPPED | OUTPATIENT
Start: 2019-12-03 | End: 2020-05-18

## 2019-12-03 NOTE — PROGRESS NOTES
Priya Rayo is a 52year old female. Patient presents with: Follow - Up: med refill. HPI:   Still doing well on NP thyroid. Started supplements. Feels good overall. Trying to be dairy and gluten free. Wean off la crouix to regular water. • Multiple Vitamins-Minerals (MULTIVITAL) Oral Tab Take 1 tablet by mouth daily. • Cholecalciferol (VITAMIN D) 1000 units Oral Tab Take by mouth.          SOCIAL HISTORY:   Social History    Socioeconomic History      Marital status:       Spouse Weight Concern: Not Asked    Social History Narrative      Not on file      SURGICAL HISTORY:     Past Surgical History:   Procedure Laterality Date   • Colonoscopy  2010   • Colonoscopy     • Colonoscopy N/A 8/13/2019    Procedure: COLONOSCOPY;  [place] [facility] order given to patient. Given further recommendations as below. Orders Placed This Visit:  No orders of the defined types were placed in this encounter. Patient Instructions       Take 0.50 mL 2-3 times daily.     Find at [employer]

## 2020-03-26 NOTE — PATIENT INSTRUCTIONS
I have complete giovany in the body's ability to heal and transform. The products and items listed below (the “Products”)  and their claims have not been evaluated by the Food and Drug Administration.  Dietary products are not intended to treat, prevent, m · Set aside 5 min throughout your day to sit in silence, meditation or deep breathing; If you can fit in taking a shower and brushing your teeth, then you can fit in this quiet time for yourself. · Find a special, quiet place to sit.   · Start by sitting i From Dimitri Hartman: “Breathing in, I calm my body. Breathing out, I smile. Dwelling in the present moment, I know this is a wonderful moment.”    Keep a gratitude journal of 5 things that you are grateful for every day.      Andreas Alfredo Meditation - Phone Apps for Mindfulness and Meditation  Headspace  The Mindfulness Mis  App[employer]  Buddhify  Smiling Mind  The Meditation Timer Pro  Stop, Breathe and Think  Pranayama  Insight Timer  Breathe to Relax  Calm    Check out these books:  Conscious Breathing:

## 2020-03-26 NOTE — PROGRESS NOTES
Essie Dillard is a 52year old female. Patient presents with: Follow - Up      HPI:     Virtual/Telephone Check-In    Adela Brooks Mitchsamirmartha verbally consents to a Virtual/Telephone Check-In service on 03/26/20.   Patient understands and accepts financ Social History    Socioeconomic History      Marital status:       Spouse name: Not on file      Number of children: Not on file      Years of education: Not on file      Highest education level: Not on file    Occupational History      Not on file • Colonoscopy  2010   • Colonoscopy     • Colonoscopy N/A 8/13/2019    Procedure: COLONOSCOPY;  Surgeon: Julian Shin MD;  Location: [facility]   • Patient denies any surgical history         PHYSICAL EXAM:   There were no vitals filed for this visit. - Lavender essential oil - apply a few drops behind ears and under nose prior to sleep  - Rescue Sleep by Andrea Dia        An all natural homeopathic blend to help you get to sleep and stay asleep. Safe to use with any medications or supplements. · Focus on positive things in your life. You are a powerful creator in your life and when you focus on the positive you will be surprised at how that changes your world.      How to Get Started with a Meditation or Deep Breathing Practice    Start with 5 mi The absolute time you spend on each phase is not important; the ratio of 4:7:8 is important. If you have trouble holding your breath, speed the exercise up but keep to the ratio of 4:7:8 for the three phases.  With practice you can slow it all down and get

## 2020-05-16 DIAGNOSIS — E06.3 HASHIMOTO'S THYROIDITIS: ICD-10-CM

## 2020-05-18 RX ORDER — THYROID 60 MG/1
TABLET ORAL
Qty: 60 TABLET | Refills: 0 | Status: SHIPPED | OUTPATIENT
Start: 2020-05-18 | End: 2020-06-12

## 2020-06-11 DIAGNOSIS — E06.3 HASHIMOTO'S THYROIDITIS: Primary | ICD-10-CM

## 2020-06-11 DIAGNOSIS — E06.3 HASHIMOTO'S THYROIDITIS: ICD-10-CM

## 2020-06-12 RX ORDER — LEVOTHYROXINE AND LIOTHYRONINE 38; 9 UG/1; UG/1
TABLET ORAL
Qty: 60 TABLET | Refills: 0 | Status: SHIPPED | OUTPATIENT
Start: 2020-06-12 | End: 2020-07-13

## 2020-06-22 ENCOUNTER — APPOINTMENT (OUTPATIENT)
Dept: LAB | Age: 50
End: 2020-06-22
Attending: PHYSICIAN ASSISTANT
Payer: COMMERCIAL

## 2020-06-22 DIAGNOSIS — E06.3 HASHIMOTO'S THYROIDITIS: ICD-10-CM

## 2020-06-22 PROCEDURE — 86800 THYROGLOBULIN ANTIBODY: CPT

## 2020-06-22 PROCEDURE — 84481 FREE ASSAY (FT-3): CPT

## 2020-06-22 PROCEDURE — 86376 MICROSOMAL ANTIBODY EACH: CPT

## 2020-06-22 PROCEDURE — 84443 ASSAY THYROID STIM HORMONE: CPT

## 2020-06-22 PROCEDURE — 36415 COLL VENOUS BLD VENIPUNCTURE: CPT

## 2020-06-22 PROCEDURE — 84439 ASSAY OF FREE THYROXINE: CPT

## 2020-06-25 ENCOUNTER — TELEMEDICINE (OUTPATIENT)
Dept: INTEGRATIVE MEDICINE | Facility: CLINIC | Age: 50
End: 2020-06-25
Payer: COMMERCIAL

## 2020-06-25 DIAGNOSIS — E06.3 HASHIMOTO'S THYROIDITIS: Primary | ICD-10-CM

## 2020-06-25 PROCEDURE — 99213 OFFICE O/P EST LOW 20 MIN: CPT | Performed by: PHYSICIAN ASSISTANT

## 2020-06-25 NOTE — PROGRESS NOTES
Vishal Metzger is a 52year old female. Patient presents with:  Thyroid Problem      HPI:   Recently had labs done for thyroid. Showed overacitve with elevated T3.  Patient states that she was told by pharmacist that the medication lot she had was n 3350-KCl-NaBcb-NaCl-NaSulf (COLYTE WITH FLAVOR PACKS) 240 g Oral Recon Soln As directed per GI consult notes 1 Bottle 0   • baclofen 10 MG Oral Tab Take 1 tablet (10 mg total) by mouth 3 (three) times daily as needed (muscle spasm).  30 tablet 0   • Multipl abused: Not on file        Forced sexual activity: Not on file    Other Topics      Concerns:        Caffeine Concern: Not Asked        Exercise: Not Asked        Seat Belt: Not Asked        Special Diet: Not Asked        Stress Concern: Not Asked        W

## 2020-06-29 ENCOUNTER — MED REC SCAN ONLY (OUTPATIENT)
Dept: INTEGRATIVE MEDICINE | Facility: CLINIC | Age: 50
End: 2020-06-29

## 2020-07-11 DIAGNOSIS — E06.3 HASHIMOTO'S THYROIDITIS: ICD-10-CM

## 2020-07-13 RX ORDER — LEVOTHYROXINE AND LIOTHYRONINE 38; 9 UG/1; UG/1
TABLET ORAL
Qty: 60 TABLET | Refills: 0 | Status: SHIPPED | OUTPATIENT
Start: 2020-07-13 | End: 2020-08-10

## 2020-08-08 DIAGNOSIS — E06.3 HASHIMOTO'S THYROIDITIS: ICD-10-CM

## 2020-08-10 RX ORDER — LEVOTHYROXINE, LIOTHYRONINE 38; 9 UG/1; UG/1
TABLET ORAL
Qty: 60 TABLET | Refills: 0 | Status: SHIPPED | OUTPATIENT
Start: 2020-08-10 | End: 2020-09-03

## 2020-08-10 NOTE — TELEPHONE ENCOUNTER
A refill request was received for:  Requested Prescriptions     Pending Prescriptions Disp Refills   • NP THYROID 60 MG Oral Tab [Pharmacy Med Name: NP THYROID 60 MG TABLET] 60 tablet 0     Sig: TAKE 2 TABLETS BY MOUTH EVERY DAY     Last refill date: 07/13

## 2020-09-01 DIAGNOSIS — E06.3 HASHIMOTO'S THYROIDITIS: ICD-10-CM

## 2020-09-01 NOTE — TELEPHONE ENCOUNTER
A refill request was received for:  Requested Prescriptions     Pending Prescriptions Disp Refills   • NP THYROID 60 MG Oral Tab [Pharmacy Med Name: NP THYROID 60 MG TABLET] 60 tablet 0     Sig: TAKE 2 TABLETS BY MOUTH EVERY DAY     Last refill date: 8.10.

## 2020-09-02 ENCOUNTER — LABORATORY ENCOUNTER (OUTPATIENT)
Dept: LAB | Facility: REFERENCE LAB | Age: 50
End: 2020-09-02
Attending: PHYSICIAN ASSISTANT
Payer: COMMERCIAL

## 2020-09-02 DIAGNOSIS — E06.3 HASHIMOTO'S THYROIDITIS: ICD-10-CM

## 2020-09-02 LAB
T3FREE SERPL-MCNC: 2.54 PG/ML (ref 2.4–4.2)
T4 FREE SERPL-MCNC: 0.7 NG/DL (ref 0.8–1.7)
TSI SER-ACNC: 3.17 MIU/ML (ref 0.36–3.74)

## 2020-09-02 PROCEDURE — 36415 COLL VENOUS BLD VENIPUNCTURE: CPT

## 2020-09-02 PROCEDURE — 84481 FREE ASSAY (FT-3): CPT

## 2020-09-02 PROCEDURE — 84443 ASSAY THYROID STIM HORMONE: CPT

## 2020-09-02 PROCEDURE — 84439 ASSAY OF FREE THYROXINE: CPT

## 2020-09-03 RX ORDER — LEVOTHYROXINE, LIOTHYRONINE 38; 9 UG/1; UG/1
TABLET ORAL
Qty: 60 TABLET | Refills: 0 | Status: SHIPPED | OUTPATIENT
Start: 2020-09-03 | End: 2020-09-10

## 2020-09-10 ENCOUNTER — TELEMEDICINE (OUTPATIENT)
Dept: INTEGRATIVE MEDICINE | Facility: CLINIC | Age: 50
End: 2020-09-10

## 2020-09-10 DIAGNOSIS — E06.3 HASHIMOTO'S THYROIDITIS: Primary | ICD-10-CM

## 2020-09-10 DIAGNOSIS — R25.2 LEG CRAMPING: ICD-10-CM

## 2020-09-10 DIAGNOSIS — M79.604 RIGHT LEG PAIN: ICD-10-CM

## 2020-09-10 PROCEDURE — 99214 OFFICE O/P EST MOD 30 MIN: CPT | Performed by: PHYSICIAN ASSISTANT

## 2020-09-10 RX ORDER — LEVOTHYROXINE, LIOTHYRONINE 38; 9 UG/1; UG/1
120 TABLET ORAL DAILY
Qty: 180 TABLET | Refills: 1 | Status: SHIPPED | OUTPATIENT
Start: 2020-09-10 | End: 2021-03-30

## 2020-09-10 NOTE — PROGRESS NOTES
Caprice Angel is a 52year old female. Patient presents with:  Thyroid Problem: follow up on labs  Leg Pain      HPI:   Patient presents to follow up on thyroid. T3 much better. Most likely had a recalled batch of thyroid medication.  Pain in calf The patient presents with a history of hearing loss and tinnitus. He will be referred for a hearing aid consultation and to the tinnitus management program with Audiology. The patient will also use bactroban ointment in the anterior nares for eight weeks and he will be seen again in eight weeks to assess his progress with therapy.    • PEG 3350-KCl-NaBcb-NaCl-NaSulf (COLYTE WITH FLAVOR PACKS) 240 g Oral Recon Soln As directed per GI consult notes 1 Bottle 0   • baclofen 10 MG Oral Tab Take 1 tablet (10 mg total) by mouth 3 (three) times daily as needed (muscle spasm).  30 tablet 0   • M Physically abused: Not on file        Forced sexual activity: Not on file    Other Topics      Concerns:        Caffeine Concern: Not Asked        Exercise: Not Asked        Seat Belt: Not Asked        Special Diet: Not Asked        Stress Concern: N Why: Magnesium is a cofactor in more than 300 enzyme systems that regulate diverse biochemical reactions in the body, including protein synthesis, muscle and nerve function, blood glucose control, and blood pressure regulation.  Magnesium is required for en

## 2020-09-10 NOTE — PATIENT INSTRUCTIONS
Magnesium Glycinate      Take 400mg twice daily  Where: [facility] ([employer] [employer])  Why: Magnesium is a cofactor in more than 300 enzyme systems that regulate diverse biochemical reactions in the body, including protein synthesis,

## 2020-11-12 ENCOUNTER — APPOINTMENT (OUTPATIENT)
Dept: GENERAL RADIOLOGY | Age: 50
End: 2020-11-12
Attending: NURSE PRACTITIONER
Payer: COMMERCIAL

## 2020-11-12 ENCOUNTER — HOSPITAL ENCOUNTER (OUTPATIENT)
Age: 50
Discharge: HOME | End: 2020-11-12
Payer: COMMERCIAL

## 2020-11-12 VITALS
DIASTOLIC BLOOD PRESSURE: 79 MMHG | SYSTOLIC BLOOD PRESSURE: 116 MMHG | HEART RATE: 81 BPM | TEMPERATURE: 98 F | RESPIRATION RATE: 19 BRPM | OXYGEN SATURATION: 100 %

## 2020-11-12 DIAGNOSIS — S69.91XA INJURY OF FINGER OF RIGHT HAND, INITIAL ENCOUNTER: Primary | ICD-10-CM

## 2020-11-12 DIAGNOSIS — M19.049 ARTHRITIS OF FINGER: ICD-10-CM

## 2020-11-12 PROCEDURE — 73140 X-RAY EXAM OF FINGER(S): CPT | Performed by: NURSE PRACTITIONER

## 2020-11-12 PROCEDURE — 99213 OFFICE O/P EST LOW 20 MIN: CPT | Performed by: NURSE PRACTITIONER

## 2020-11-12 NOTE — ED PROVIDER NOTES
Patient Seen in: [facility]      History   Patient presents with:  Arm or Hand Injury    Stated Complaint: INJURY RIGHT HAND    HPI    This is a 80-year-old female presenting for right middle finger injury.   Patient states, she injured her t Vitals signs and nursing note reviewed. Constitutional:       Appearance: Normal appearance. HENT:      Head: Normocephalic and atraumatic.       Right Ear: External ear normal.      Left Ear: External ear normal.      Nose: Nose normal.      Mouth/Thro Injury of finger of right hand, initial encounter  (primary encounter diagnosis)  Arthritis of finger    Disposition:  Discharge  11/12/2020 11:25 am    Follow-up:  Bishnu Martinez DO  [phone]

## 2021-03-30 ENCOUNTER — TELEPHONE (OUTPATIENT)
Dept: INTEGRATIVE MEDICINE | Facility: CLINIC | Age: 51
End: 2021-03-30

## 2021-03-30 DIAGNOSIS — E06.3 HASHIMOTO'S THYROIDITIS: ICD-10-CM

## 2021-03-30 RX ORDER — LEVOTHYROXINE, LIOTHYRONINE 38; 9 UG/1; UG/1
120 TABLET ORAL DAILY
Qty: 180 TABLET | Refills: 0 | Status: SHIPPED | OUTPATIENT
Start: 2021-03-30 | End: 2021-06-30

## 2021-03-30 NOTE — TELEPHONE ENCOUNTER
A refill request was received for:  Requested Prescriptions     Pending Prescriptions Disp Refills   • NP THYROID 60 MG Oral Tab 180 tablet 1     Sig: Take 2 tablets (120 mg total) by mouth daily.      Last refill date: 09.10.20  Qty: 180 tabs w/1 refill  L

## 2021-03-30 NOTE — TELEPHONE ENCOUNTER
Patient called to get a refill for thyroid, has appt for 6/28/21, will need to get order for lab as well.      #[phone]

## 2021-06-27 DIAGNOSIS — E06.3 HASHIMOTO'S THYROIDITIS: ICD-10-CM

## 2021-06-28 ENCOUNTER — TELEMEDICINE (OUTPATIENT)
Dept: INTEGRATIVE MEDICINE | Facility: CLINIC | Age: 51
End: 2021-06-28

## 2021-06-28 DIAGNOSIS — E03.8 HYPOTHYROIDISM DUE TO HASHIMOTO'S THYROIDITIS: ICD-10-CM

## 2021-06-28 DIAGNOSIS — Z01.89 ENCOUNTER FOR ROUTINE LABORATORY TESTING: ICD-10-CM

## 2021-06-28 DIAGNOSIS — E06.3 HASHIMOTO'S THYROIDITIS: Primary | ICD-10-CM

## 2021-06-28 DIAGNOSIS — E06.3 HYPOTHYROIDISM DUE TO HASHIMOTO'S THYROIDITIS: ICD-10-CM

## 2021-06-28 PROBLEM — G89.29 CHRONIC PAIN OF RIGHT KNEE: Status: ACTIVE | Noted: 2021-01-31

## 2021-06-28 PROBLEM — M25.561 CHRONIC PAIN OF RIGHT KNEE: Status: ACTIVE | Noted: 2021-01-31

## 2021-06-28 PROCEDURE — 99214 OFFICE O/P EST MOD 30 MIN: CPT | Performed by: FAMILY MEDICINE

## 2021-06-28 NOTE — PATIENT INSTRUCTIONS
I have complete giovany in the body's ability to heal and transform. The products and items listed below (the “Products”)  and their claims have not been evaluated by the Food and Drug Administration.  Dietary products are not intended to treat, prevent, m

## 2021-06-28 NOTE — PROGRESS NOTES
Jasson Willard is a 48year old female. Patient presents with: Follow - Up      HPI:     Sleep is not great, will wake up in the night. Taking Valerian which helps. No longer using ambien. Exercising regularly.    Energy ebbs and flows based on       Spouse name: Not on file      Number of children: Not on file      Years of education: Not on file      Highest education level: Not on file    Occupational History      Not on file    Tobacco Use      Smoking status: Former Smoker        Year Colonoscopy N/A 8/13/2019    Procedure: COLONOSCOPY;  Surgeon: Dede Garduno MD;  Location: [facility]   • Patient denies any surgical history         PHYSICAL EXAM:       Physical Exam  HENT:      Head: Normocephalic and atraumatic.    Eyes:      Conjun biotin (a.k.a., vitamin B7, vitamin H, coenzyme R) supplements resulting in serum concentrations >100 ng/mL.   Intake of the recommended daily allowance (RDA) for biotin (0.03 mg) has not been shown to typically cause significant interference; however, high Future  - FREE T3 (TRIIODOTHYRONINE); Future  - CBC WITH DIFFERENTIAL WITH PLATELET; Future  - COMP METABOLIC PANEL (14); Future  - HEMOGLOBIN A1C; Future  - LIPID PANEL;  Future  - FERRITIN; Future  - VITAMIN D, 25-HYDROXY; Future    Evaluate complete thyr healthcare professional and stop use of Products should any reactions arise. Recommendations:    Books to look into:          Return in about 4 months (around 10/28/2021) for Complete Physical (30 min).     Patient affirmed understanding of plan and all

## 2021-06-29 ENCOUNTER — LAB ENCOUNTER (OUTPATIENT)
Dept: LAB | Facility: REFERENCE LAB | Age: 51
End: 2021-06-29
Attending: FAMILY MEDICINE
Payer: COMMERCIAL

## 2021-06-29 DIAGNOSIS — Z01.89 ENCOUNTER FOR ROUTINE LABORATORY TESTING: ICD-10-CM

## 2021-06-29 DIAGNOSIS — E06.3 HYPOTHYROIDISM DUE TO HASHIMOTO'S THYROIDITIS: ICD-10-CM

## 2021-06-29 DIAGNOSIS — E06.3 HASHIMOTO'S THYROIDITIS: ICD-10-CM

## 2021-06-29 DIAGNOSIS — E03.8 HYPOTHYROIDISM DUE TO HASHIMOTO'S THYROIDITIS: ICD-10-CM

## 2021-06-29 LAB
ALBUMIN SERPL-MCNC: 3.5 G/DL (ref 3.4–5)
ALBUMIN/GLOB SERPL: 1 {RATIO} (ref 1–2)
ALP LIVER SERPL-CCNC: 50 U/L
ALT SERPL-CCNC: 19 U/L
ANION GAP SERPL CALC-SCNC: 5 MMOL/L (ref 0–18)
AST SERPL-CCNC: 17 U/L (ref 15–37)
BASOPHILS # BLD AUTO: 0.01 X10(3) UL (ref 0–0.2)
BASOPHILS NFR BLD AUTO: 0.3 %
BILIRUB SERPL-MCNC: 0.6 MG/DL (ref 0.1–2)
BUN BLD-MCNC: 20 MG/DL (ref 7–18)
BUN/CREAT SERPL: 30.3 (ref 10–20)
CALCIUM BLD-MCNC: 8.9 MG/DL (ref 8.5–10.1)
CHLORIDE SERPL-SCNC: 111 MMOL/L (ref 98–112)
CHOLEST SMN-MCNC: 169 MG/DL (ref ?–200)
CO2 SERPL-SCNC: 26 MMOL/L (ref 21–32)
CREAT BLD-MCNC: 0.66 MG/DL
DEPRECATED HBV CORE AB SER IA-ACNC: 14.1 NG/ML
DEPRECATED RDW RBC AUTO: 45.7 FL (ref 35.1–46.3)
EOSINOPHIL # BLD AUTO: 0.19 X10(3) UL (ref 0–0.7)
EOSINOPHIL NFR BLD AUTO: 5.5 %
ERYTHROCYTE [DISTWIDTH] IN BLOOD BY AUTOMATED COUNT: 13.4 % (ref 11–15)
EST. AVERAGE GLUCOSE BLD GHB EST-MCNC: 111 MG/DL (ref 68–126)
GLOBULIN PLAS-MCNC: 3.4 G/DL (ref 2.8–4.4)
GLUCOSE BLD-MCNC: 106 MG/DL (ref 70–99)
HBA1C MFR BLD HPLC: 5.5 % (ref ?–5.7)
HCT VFR BLD AUTO: 41.1 %
HDLC SERPL-MCNC: 78 MG/DL (ref 40–59)
HGB BLD-MCNC: 13.4 G/DL
IMM GRANULOCYTES # BLD AUTO: 0 X10(3) UL (ref 0–1)
IMM GRANULOCYTES NFR BLD: 0 %
LDLC SERPL CALC-MCNC: 83 MG/DL (ref ?–100)
LYMPHOCYTES # BLD AUTO: 0.97 X10(3) UL (ref 1–4)
LYMPHOCYTES NFR BLD AUTO: 28.1 %
M PROTEIN MFR SERPL ELPH: 6.9 G/DL (ref 6.4–8.2)
MCH RBC QN AUTO: 30 PG (ref 26–34)
MCHC RBC AUTO-ENTMCNC: 32.6 G/DL (ref 31–37)
MCV RBC AUTO: 92.2 FL
MONOCYTES # BLD AUTO: 0.48 X10(3) UL (ref 0.1–1)
MONOCYTES NFR BLD AUTO: 13.9 %
NEUTROPHILS # BLD AUTO: 1.8 X10 (3) UL (ref 1.5–7.7)
NEUTROPHILS # BLD AUTO: 1.8 X10(3) UL (ref 1.5–7.7)
NEUTROPHILS NFR BLD AUTO: 52.2 %
NONHDLC SERPL-MCNC: 91 MG/DL (ref ?–130)
OSMOLALITY SERPL CALC.SUM OF ELEC: 297 MOSM/KG (ref 275–295)
PATIENT FASTING Y/N/NP: YES
PATIENT FASTING Y/N/NP: YES
PLATELET # BLD AUTO: 210 10(3)UL (ref 150–450)
POTASSIUM SERPL-SCNC: 4.4 MMOL/L (ref 3.5–5.1)
RBC # BLD AUTO: 4.46 X10(6)UL
SODIUM SERPL-SCNC: 142 MMOL/L (ref 136–145)
T3FREE SERPL-MCNC: 2.78 PG/ML (ref 2.4–4.2)
T4 FREE SERPL-MCNC: 0.8 NG/DL (ref 0.8–1.7)
THYROGLOB SERPL-MCNC: 394 U/ML (ref ?–60)
THYROPEROXIDASE AB SERPL-ACNC: 2151 U/ML (ref ?–60)
TRIGL SERPL-MCNC: 38 MG/DL (ref 30–149)
TSI SER-ACNC: 0.57 MIU/ML (ref 0.36–3.74)
VLDLC SERPL CALC-MCNC: 6 MG/DL (ref 0–30)
WBC # BLD AUTO: 3.5 X10(3) UL (ref 4–11)

## 2021-06-29 PROCEDURE — 84481 FREE ASSAY (FT-3): CPT

## 2021-06-29 PROCEDURE — 86800 THYROGLOBULIN ANTIBODY: CPT

## 2021-06-29 PROCEDURE — 86376 MICROSOMAL ANTIBODY EACH: CPT

## 2021-06-29 PROCEDURE — 36415 COLL VENOUS BLD VENIPUNCTURE: CPT

## 2021-06-29 PROCEDURE — 83036 HEMOGLOBIN GLYCOSYLATED A1C: CPT

## 2021-06-29 PROCEDURE — 80061 LIPID PANEL: CPT

## 2021-06-29 PROCEDURE — 84439 ASSAY OF FREE THYROXINE: CPT

## 2021-06-29 PROCEDURE — 84443 ASSAY THYROID STIM HORMONE: CPT

## 2021-06-29 PROCEDURE — 85025 COMPLETE CBC W/AUTO DIFF WBC: CPT

## 2021-06-29 PROCEDURE — 82728 ASSAY OF FERRITIN: CPT

## 2021-06-29 PROCEDURE — 82306 VITAMIN D 25 HYDROXY: CPT

## 2021-06-29 PROCEDURE — 80053 COMPREHEN METABOLIC PANEL: CPT

## 2021-06-30 LAB — 25(OH)D3 SERPL-MCNC: 44.7 NG/ML (ref 30–100)

## 2021-06-30 RX ORDER — LEVOTHYROXINE, LIOTHYRONINE 38; 9 UG/1; UG/1
120 TABLET ORAL DAILY
Qty: 180 TABLET | Refills: 1 | Status: SHIPPED | OUTPATIENT
Start: 2021-06-30 | End: 2022-01-03

## 2022-01-03 DIAGNOSIS — E06.3 HASHIMOTO'S THYROIDITIS: ICD-10-CM

## 2022-01-03 RX ORDER — LEVOTHYROXINE, LIOTHYRONINE 38; 9 UG/1; UG/1
TABLET ORAL
Qty: 180 TABLET | Refills: 1 | Status: SHIPPED | OUTPATIENT
Start: 2022-01-03

## 2022-04-12 ENCOUNTER — OFFICE VISIT (OUTPATIENT)
Dept: INTEGRATIVE MEDICINE | Facility: CLINIC | Age: 52
End: 2022-04-12
Payer: COMMERCIAL

## 2022-04-12 VITALS
OXYGEN SATURATION: 99 % | HEIGHT: 66.5 IN | WEIGHT: 164.19 LBS | SYSTOLIC BLOOD PRESSURE: 96 MMHG | HEART RATE: 78 BPM | DIASTOLIC BLOOD PRESSURE: 54 MMHG | BODY MASS INDEX: 26.08 KG/M2

## 2022-04-12 DIAGNOSIS — R79.0 LOW FERRITIN: ICD-10-CM

## 2022-04-12 DIAGNOSIS — E06.3 HYPOTHYROIDISM DUE TO HASHIMOTO'S THYROIDITIS: ICD-10-CM

## 2022-04-12 DIAGNOSIS — Z00.00 ROUTINE MEDICAL EXAM: Primary | ICD-10-CM

## 2022-04-12 DIAGNOSIS — E06.3 HASHIMOTO'S THYROIDITIS: ICD-10-CM

## 2022-04-12 DIAGNOSIS — N95.1 PERIMENOPAUSE: ICD-10-CM

## 2022-04-12 DIAGNOSIS — E03.8 HYPOTHYROIDISM DUE TO HASHIMOTO'S THYROIDITIS: ICD-10-CM

## 2022-04-12 PROCEDURE — 99396 PREV VISIT EST AGE 40-64: CPT | Performed by: FAMILY MEDICINE

## 2022-04-12 PROCEDURE — 3008F BODY MASS INDEX DOCD: CPT | Performed by: FAMILY MEDICINE

## 2022-04-12 PROCEDURE — 3078F DIAST BP <80 MM HG: CPT | Performed by: FAMILY MEDICINE

## 2022-04-12 PROCEDURE — 3074F SYST BP LT 130 MM HG: CPT | Performed by: FAMILY MEDICINE

## 2022-05-02 ENCOUNTER — LAB ENCOUNTER (OUTPATIENT)
Dept: LAB | Facility: REFERENCE LAB | Age: 52
End: 2022-05-02
Attending: FAMILY MEDICINE
Payer: COMMERCIAL

## 2022-05-02 DIAGNOSIS — E06.3 HASHIMOTO'S THYROIDITIS: ICD-10-CM

## 2022-05-02 DIAGNOSIS — Z00.00 ROUTINE MEDICAL EXAM: ICD-10-CM

## 2022-05-02 DIAGNOSIS — E03.8 HYPOTHYROIDISM DUE TO HASHIMOTO'S THYROIDITIS: ICD-10-CM

## 2022-05-02 DIAGNOSIS — R79.0 LOW FERRITIN: ICD-10-CM

## 2022-05-02 DIAGNOSIS — E06.3 HYPOTHYROIDISM DUE TO HASHIMOTO'S THYROIDITIS: ICD-10-CM

## 2022-05-02 LAB
ALBUMIN SERPL-MCNC: 3.7 G/DL (ref 3.4–5)
ALBUMIN/GLOB SERPL: 1.1 {RATIO} (ref 1–2)
ALP LIVER SERPL-CCNC: 50 U/L
ALT SERPL-CCNC: 17 U/L
ANION GAP SERPL CALC-SCNC: 3 MMOL/L (ref 0–18)
AST SERPL-CCNC: 12 U/L (ref 15–37)
BASOPHILS # BLD AUTO: 0.02 X10(3) UL (ref 0–0.2)
BASOPHILS NFR BLD AUTO: 0.4 %
BILIRUB SERPL-MCNC: 0.4 MG/DL (ref 0.1–2)
BUN BLD-MCNC: 20 MG/DL (ref 7–18)
BUN/CREAT SERPL: 26 (ref 10–20)
CALCIUM BLD-MCNC: 9.1 MG/DL (ref 8.5–10.1)
CHLORIDE SERPL-SCNC: 109 MMOL/L (ref 98–112)
CHOLEST SERPL-MCNC: 178 MG/DL (ref ?–200)
CO2 SERPL-SCNC: 30 MMOL/L (ref 21–32)
CREAT BLD-MCNC: 0.77 MG/DL
DEPRECATED HBV CORE AB SER IA-ACNC: 22.8 NG/ML
DEPRECATED RDW RBC AUTO: 41.1 FL (ref 35.1–46.3)
EOSINOPHIL # BLD AUTO: 0.31 X10(3) UL (ref 0–0.7)
EOSINOPHIL NFR BLD AUTO: 6 %
ERYTHROCYTE [DISTWIDTH] IN BLOOD BY AUTOMATED COUNT: 12.3 % (ref 11–15)
EST. AVERAGE GLUCOSE BLD GHB EST-MCNC: 105 MG/DL (ref 68–126)
FASTING PATIENT LIPID ANSWER: NO
FASTING STATUS PATIENT QL REPORTED: NO
GLOBULIN PLAS-MCNC: 3.3 G/DL (ref 2.8–4.4)
GLUCOSE BLD-MCNC: 102 MG/DL (ref 70–99)
HBA1C MFR BLD: 5.3 % (ref ?–5.7)
HCT VFR BLD AUTO: 41.3 %
HDLC SERPL-MCNC: 75 MG/DL (ref 40–59)
HGB BLD-MCNC: 13.8 G/DL
IMM GRANULOCYTES # BLD AUTO: 0.01 X10(3) UL (ref 0–1)
IMM GRANULOCYTES NFR BLD: 0.2 %
LDLC SERPL CALC-MCNC: 93 MG/DL (ref ?–100)
LYMPHOCYTES # BLD AUTO: 1.33 X10(3) UL (ref 1–4)
LYMPHOCYTES NFR BLD AUTO: 25.8 %
MCH RBC QN AUTO: 30.5 PG (ref 26–34)
MCHC RBC AUTO-ENTMCNC: 33.4 G/DL (ref 31–37)
MCV RBC AUTO: 91.4 FL
MONOCYTES # BLD AUTO: 0.41 X10(3) UL (ref 0.1–1)
MONOCYTES NFR BLD AUTO: 7.9 %
NEUTROPHILS # BLD AUTO: 3.08 X10 (3) UL (ref 1.5–7.7)
NEUTROPHILS # BLD AUTO: 3.08 X10(3) UL (ref 1.5–7.7)
NEUTROPHILS NFR BLD AUTO: 59.7 %
NONHDLC SERPL-MCNC: 103 MG/DL (ref ?–130)
OSMOLALITY SERPL CALC.SUM OF ELEC: 297 MOSM/KG (ref 275–295)
PLATELET # BLD AUTO: 219 10(3)UL (ref 150–450)
POTASSIUM SERPL-SCNC: 4.5 MMOL/L (ref 3.5–5.1)
PROT SERPL-MCNC: 7 G/DL (ref 6.4–8.2)
RBC # BLD AUTO: 4.52 X10(6)UL
SODIUM SERPL-SCNC: 142 MMOL/L (ref 136–145)
T3FREE SERPL-MCNC: 3.07 PG/ML (ref 2.4–4.2)
T4 FREE SERPL-MCNC: 0.8 NG/DL (ref 0.8–1.7)
THYROGLOB SERPL-MCNC: 297 U/ML (ref ?–60)
THYROPEROXIDASE AB SERPL-ACNC: 2361 U/ML (ref ?–60)
TRIGL SERPL-MCNC: 49 MG/DL (ref 30–149)
TSI SER-ACNC: 0.22 MIU/ML (ref 0.36–3.74)
VIT B12 SERPL-MCNC: 574 PG/ML (ref 193–986)
VIT D+METAB SERPL-MCNC: 32.7 NG/ML (ref 30–100)
VLDLC SERPL CALC-MCNC: 8 MG/DL (ref 0–30)
WBC # BLD AUTO: 5.2 X10(3) UL (ref 4–11)

## 2022-05-02 PROCEDURE — 36415 COLL VENOUS BLD VENIPUNCTURE: CPT

## 2022-05-02 PROCEDURE — 82728 ASSAY OF FERRITIN: CPT

## 2022-05-02 PROCEDURE — 84481 FREE ASSAY (FT-3): CPT

## 2022-05-02 PROCEDURE — 80053 COMPREHEN METABOLIC PANEL: CPT

## 2022-05-02 PROCEDURE — 80061 LIPID PANEL: CPT

## 2022-05-02 PROCEDURE — 84443 ASSAY THYROID STIM HORMONE: CPT

## 2022-05-02 PROCEDURE — 81401 MOPATH PROCEDURE LEVEL 2: CPT

## 2022-05-02 PROCEDURE — 82607 VITAMIN B-12: CPT

## 2022-05-02 PROCEDURE — 86800 THYROGLOBULIN ANTIBODY: CPT

## 2022-05-02 PROCEDURE — 85025 COMPLETE CBC W/AUTO DIFF WBC: CPT

## 2022-05-02 PROCEDURE — 82306 VITAMIN D 25 HYDROXY: CPT

## 2022-05-02 PROCEDURE — 86376 MICROSOMAL ANTIBODY EACH: CPT

## 2022-05-02 PROCEDURE — 84207 ASSAY OF VITAMIN B-6: CPT

## 2022-05-02 PROCEDURE — 83036 HEMOGLOBIN GLYCOSYLATED A1C: CPT

## 2022-05-02 PROCEDURE — 84439 ASSAY OF FREE THYROXINE: CPT

## 2022-05-04 LAB — VITAMIN B6: 159.9 NMOL/L

## 2022-07-02 DIAGNOSIS — E06.3 HASHIMOTO'S THYROIDITIS: ICD-10-CM

## 2022-07-05 RX ORDER — LEVOTHYROXINE, LIOTHYRONINE 38; 9 UG/1; UG/1
TABLET ORAL
Qty: 180 TABLET | Refills: 1 | Status: SHIPPED | OUTPATIENT
Start: 2022-07-05

## 2022-08-31 ENCOUNTER — OFFICE VISIT (OUTPATIENT)
Dept: OBGYN CLINIC | Facility: CLINIC | Age: 52
End: 2022-08-31
Payer: COMMERCIAL

## 2022-08-31 VITALS
DIASTOLIC BLOOD PRESSURE: 62 MMHG | HEIGHT: 66 IN | SYSTOLIC BLOOD PRESSURE: 118 MMHG | BODY MASS INDEX: 25.69 KG/M2 | WEIGHT: 159.88 LBS

## 2022-08-31 DIAGNOSIS — Z12.4 ROUTINE CERVICAL SMEAR: Primary | ICD-10-CM

## 2022-08-31 DIAGNOSIS — Z01.419 WOMEN'S ANNUAL ROUTINE GYNECOLOGICAL EXAMINATION: ICD-10-CM

## 2022-08-31 DIAGNOSIS — E03.9 HYPOTHYROIDISM, UNSPECIFIED TYPE: ICD-10-CM

## 2022-08-31 DIAGNOSIS — Z12.31 BREAST CANCER SCREENING BY MAMMOGRAM: ICD-10-CM

## 2022-08-31 DIAGNOSIS — R23.2 HOT FLASHES: ICD-10-CM

## 2022-08-31 PROCEDURE — 3074F SYST BP LT 130 MM HG: CPT | Performed by: OBSTETRICS & GYNECOLOGY

## 2022-08-31 PROCEDURE — 87624 HPV HI-RISK TYP POOLED RSLT: CPT | Performed by: OBSTETRICS & GYNECOLOGY

## 2022-08-31 PROCEDURE — 99396 PREV VISIT EST AGE 40-64: CPT | Performed by: OBSTETRICS & GYNECOLOGY

## 2022-08-31 PROCEDURE — 3078F DIAST BP <80 MM HG: CPT | Performed by: OBSTETRICS & GYNECOLOGY

## 2022-08-31 PROCEDURE — 3008F BODY MASS INDEX DOCD: CPT | Performed by: OBSTETRICS & GYNECOLOGY

## 2022-08-31 PROCEDURE — 99213 OFFICE O/P EST LOW 20 MIN: CPT | Performed by: OBSTETRICS & GYNECOLOGY

## 2022-09-01 ENCOUNTER — HOSPITAL ENCOUNTER (OUTPATIENT)
Dept: MAMMOGRAPHY | Age: 52
Discharge: HOME | End: 2022-09-01
Attending: OBSTETRICS & GYNECOLOGY
Payer: COMMERCIAL

## 2022-09-01 DIAGNOSIS — Z12.31 BREAST CANCER SCREENING BY MAMMOGRAM: ICD-10-CM

## 2022-09-01 LAB — HPV I/H RISK 1 DNA SPEC QL NAA+PROBE: NEGATIVE

## 2022-09-01 PROCEDURE — 77067 SCR MAMMO BI INCL CAD: CPT | Performed by: OBSTETRICS & GYNECOLOGY

## 2022-09-01 PROCEDURE — 77063 BREAST TOMOSYNTHESIS BI: CPT | Performed by: OBSTETRICS & GYNECOLOGY

## 2022-09-06 ENCOUNTER — LAB ENCOUNTER (OUTPATIENT)
Dept: LAB | Facility: REFERENCE LAB | Age: 52
End: 2022-09-06
Attending: OBSTETRICS & GYNECOLOGY
Payer: COMMERCIAL

## 2022-09-06 DIAGNOSIS — E03.9 HYPOTHYROIDISM, UNSPECIFIED TYPE: ICD-10-CM

## 2022-09-06 DIAGNOSIS — R23.2 HOT FLASHES: ICD-10-CM

## 2022-09-06 LAB
FSH SERPL-ACNC: 14.6 MIU/ML
T4 FREE SERPL-MCNC: 0.8 NG/DL (ref 0.8–1.7)
TSI SER-ACNC: 1.13 MIU/ML (ref 0.36–3.74)

## 2022-09-06 PROCEDURE — 36415 COLL VENOUS BLD VENIPUNCTURE: CPT

## 2022-09-06 PROCEDURE — 84443 ASSAY THYROID STIM HORMONE: CPT

## 2022-09-06 PROCEDURE — 84439 ASSAY OF FREE THYROXINE: CPT | Performed by: OBSTETRICS & GYNECOLOGY

## 2022-09-06 PROCEDURE — 83001 ASSAY OF GONADOTROPIN (FSH): CPT

## 2022-09-12 ENCOUNTER — HOSPITAL ENCOUNTER (OUTPATIENT)
Age: 52
Discharge: HOME | End: 2022-09-12
Payer: COMMERCIAL

## 2022-09-12 ENCOUNTER — APPOINTMENT (OUTPATIENT)
Dept: GENERAL RADIOLOGY | Age: 52
End: 2022-09-12
Attending: NURSE PRACTITIONER
Payer: COMMERCIAL

## 2022-09-12 VITALS
SYSTOLIC BLOOD PRESSURE: 126 MMHG | OXYGEN SATURATION: 99 % | RESPIRATION RATE: 18 BRPM | DIASTOLIC BLOOD PRESSURE: 78 MMHG | TEMPERATURE: 98 F | HEART RATE: 72 BPM

## 2022-09-12 DIAGNOSIS — R05.1 ACUTE COUGH: ICD-10-CM

## 2022-09-12 DIAGNOSIS — J20.9 ACUTE BRONCHITIS, UNSPECIFIED ORGANISM: Primary | ICD-10-CM

## 2022-09-12 DIAGNOSIS — Z20.822 ENCOUNTER FOR LABORATORY TESTING FOR COVID-19 VIRUS: ICD-10-CM

## 2022-09-12 LAB — SARS-COV-2 RNA RESP QL NAA+PROBE: NOT DETECTED

## 2022-09-12 PROCEDURE — 71046 X-RAY EXAM CHEST 2 VIEWS: CPT | Performed by: NURSE PRACTITIONER

## 2022-09-12 PROCEDURE — U0002 COVID-19 LAB TEST NON-CDC: HCPCS | Performed by: NURSE PRACTITIONER

## 2022-09-12 PROCEDURE — 99213 OFFICE O/P EST LOW 20 MIN: CPT | Performed by: NURSE PRACTITIONER

## 2022-09-12 RX ORDER — ALBUTEROL SULFATE 90 UG/1
2 AEROSOL, METERED RESPIRATORY (INHALATION) EVERY 4 HOURS PRN
Qty: 1 EACH | Refills: 0 | Status: SHIPPED | OUTPATIENT
Start: 2022-09-12 | End: 2022-10-12

## 2022-09-12 RX ORDER — BENZONATATE 200 MG/1
200 CAPSULE ORAL 3 TIMES DAILY PRN
Qty: 15 CAPSULE | Refills: 0 | Status: SHIPPED | OUTPATIENT
Start: 2022-09-12

## 2022-09-12 RX ORDER — PREDNISONE 20 MG/1
40 TABLET ORAL DAILY
Qty: 10 TABLET | Refills: 0 | Status: SHIPPED | OUTPATIENT
Start: 2022-09-12 | End: 2022-09-17

## 2022-09-12 NOTE — ED INITIAL ASSESSMENT (HPI)
Pt came in due to cough, sinus pressure, congestion, and runny nose for about 2 weeks. Pt has easy non labored respirations.

## 2022-09-14 ENCOUNTER — HOSPITAL ENCOUNTER (OUTPATIENT)
Dept: MAMMOGRAPHY | Facility: HOSPITAL | Age: 52
Discharge: HOME | End: 2022-09-14
Attending: OBSTETRICS & GYNECOLOGY
Payer: COMMERCIAL

## 2022-09-14 ENCOUNTER — HOSPITAL ENCOUNTER (OUTPATIENT)
Dept: ULTRASOUND IMAGING | Facility: HOSPITAL | Age: 52
Discharge: HOME | End: 2022-09-14
Attending: OBSTETRICS & GYNECOLOGY
Payer: COMMERCIAL

## 2022-09-14 DIAGNOSIS — R92.8 ABNORMAL MAMMOGRAM: ICD-10-CM

## 2022-09-14 PROCEDURE — 77066 DX MAMMO INCL CAD BI: CPT | Performed by: OBSTETRICS & GYNECOLOGY

## 2022-09-14 PROCEDURE — 77062 BREAST TOMOSYNTHESIS BI: CPT | Performed by: OBSTETRICS & GYNECOLOGY

## 2022-09-14 PROCEDURE — 76642 ULTRASOUND BREAST LIMITED: CPT | Performed by: OBSTETRICS & GYNECOLOGY

## 2023-01-20 NOTE — TELEPHONE ENCOUNTER
Addended by: TRISTIAN GILES on: 1/19/2023 07:00 PM     Modules accepted: Orders     I Kajal Chan to let her know that mammogram that she had today at [facility] breast center showed group of calcification in the right breast that required additional imaging.   Patient is going to call breast center today and scheduled appointment f

## 2023-02-05 DIAGNOSIS — E06.3 HASHIMOTO'S THYROIDITIS: ICD-10-CM

## 2023-02-14 RX ORDER — LEVOTHYROXINE, LIOTHYRONINE 38; 9 UG/1; UG/1
TABLET ORAL
Qty: 60 TABLET | Refills: 0 | Status: SHIPPED | OUTPATIENT
Start: 2023-02-14

## 2023-03-16 DIAGNOSIS — E06.3 HASHIMOTO'S THYROIDITIS: ICD-10-CM

## 2023-03-16 RX ORDER — LEVOTHYROXINE, LIOTHYRONINE 38; 9 UG/1; UG/1
TABLET ORAL
Qty: 60 TABLET | Refills: 0 | Status: SHIPPED | OUTPATIENT
Start: 2023-03-16

## 2023-03-21 DIAGNOSIS — E06.3 HASHIMOTO'S THYROIDITIS: ICD-10-CM

## 2023-03-21 RX ORDER — LEVOTHYROXINE, LIOTHYRONINE 38; 9 UG/1; UG/1
120 TABLET ORAL DAILY
Qty: 60 TABLET | Refills: 0 | Status: SHIPPED | OUTPATIENT
Start: 2023-03-21 | End: 2023-03-23

## 2023-03-22 DIAGNOSIS — E06.3 HASHIMOTO'S THYROIDITIS: ICD-10-CM

## 2023-03-23 RX ORDER — LEVOTHYROXINE, LIOTHYRONINE 38; 9 UG/1; UG/1
120 TABLET ORAL DAILY
Qty: 60 TABLET | Refills: 0 | Status: SHIPPED | OUTPATIENT
Start: 2023-03-23

## 2023-04-18 ENCOUNTER — OFFICE VISIT (OUTPATIENT)
Dept: INTEGRATIVE MEDICINE | Facility: CLINIC | Age: 53
End: 2023-04-18
Payer: COMMERCIAL

## 2023-04-18 VITALS
SYSTOLIC BLOOD PRESSURE: 120 MMHG | BODY MASS INDEX: 25.23 KG/M2 | HEIGHT: 66 IN | HEART RATE: 61 BPM | DIASTOLIC BLOOD PRESSURE: 68 MMHG | WEIGHT: 157 LBS | OXYGEN SATURATION: 99 %

## 2023-04-18 DIAGNOSIS — E06.3 HASHIMOTO'S THYROIDITIS: ICD-10-CM

## 2023-04-18 DIAGNOSIS — Z12.11 COLON CANCER SCREENING: ICD-10-CM

## 2023-04-18 DIAGNOSIS — R79.0 LOW FERRITIN: ICD-10-CM

## 2023-04-18 DIAGNOSIS — N95.1 PERIMENOPAUSE: ICD-10-CM

## 2023-04-18 DIAGNOSIS — Z00.00 ROUTINE MEDICAL EXAM: Primary | ICD-10-CM

## 2023-04-18 PROCEDURE — 99396 PREV VISIT EST AGE 40-64: CPT | Performed by: PHYSICIAN ASSISTANT

## 2023-04-18 PROCEDURE — 3074F SYST BP LT 130 MM HG: CPT | Performed by: PHYSICIAN ASSISTANT

## 2023-04-18 PROCEDURE — 3008F BODY MASS INDEX DOCD: CPT | Performed by: PHYSICIAN ASSISTANT

## 2023-04-18 PROCEDURE — 3078F DIAST BP <80 MM HG: CPT | Performed by: PHYSICIAN ASSISTANT

## 2023-04-19 ENCOUNTER — LAB ENCOUNTER (OUTPATIENT)
Dept: LAB | Age: 53
End: 2023-04-19
Attending: PHYSICIAN ASSISTANT
Payer: COMMERCIAL

## 2023-04-19 DIAGNOSIS — E06.3 HASHIMOTO'S THYROIDITIS: ICD-10-CM

## 2023-04-19 DIAGNOSIS — N95.1 PERIMENOPAUSE: ICD-10-CM

## 2023-04-19 DIAGNOSIS — Z00.00 ROUTINE MEDICAL EXAM: ICD-10-CM

## 2023-04-19 LAB
ALBUMIN SERPL-MCNC: 3.6 G/DL (ref 3.4–5)
ALBUMIN/GLOB SERPL: 1 {RATIO} (ref 1–2)
ALP LIVER SERPL-CCNC: 55 U/L
ALT SERPL-CCNC: 19 U/L
ANION GAP SERPL CALC-SCNC: 1 MMOL/L (ref 0–18)
AST SERPL-CCNC: 16 U/L (ref 15–37)
BASOPHILS # BLD AUTO: 0.02 X10(3) UL (ref 0–0.2)
BASOPHILS NFR BLD AUTO: 0.4 %
BILIRUB SERPL-MCNC: 0.5 MG/DL (ref 0.1–2)
BUN BLD-MCNC: 18 MG/DL (ref 7–18)
BUN/CREAT SERPL: 20.5 (ref 10–20)
CALCIUM BLD-MCNC: 9.2 MG/DL (ref 8.5–10.1)
CHLORIDE SERPL-SCNC: 108 MMOL/L (ref 98–112)
CHOLEST SERPL-MCNC: 171 MG/DL (ref ?–200)
CO2 SERPL-SCNC: 30 MMOL/L (ref 21–32)
CREAT BLD-MCNC: 0.88 MG/DL
DEPRECATED HBV CORE AB SER IA-ACNC: 15.6 NG/ML
DEPRECATED RDW RBC AUTO: 41.4 FL (ref 35.1–46.3)
DHEA-S SERPL-MCNC: 144 UG/DL
EOSINOPHIL # BLD AUTO: 0.21 X10(3) UL (ref 0–0.7)
EOSINOPHIL NFR BLD AUTO: 4.6 %
ERYTHROCYTE [DISTWIDTH] IN BLOOD BY AUTOMATED COUNT: 12.5 % (ref 11–15)
EST. AVERAGE GLUCOSE BLD GHB EST-MCNC: 111 MG/DL (ref 68–126)
ESTRADIOL SERPL-MCNC: 93.7 PG/ML
FASTING PATIENT LIPID ANSWER: YES
FASTING STATUS PATIENT QL REPORTED: YES
FOLATE SERPL-MCNC: 10.6 NG/ML (ref 8.7–?)
GFR SERPLBLD BASED ON 1.73 SQ M-ARVRAT: 79 ML/MIN/1.73M2 (ref 60–?)
GLOBULIN PLAS-MCNC: 3.6 G/DL (ref 2.8–4.4)
GLUCOSE BLD-MCNC: 103 MG/DL (ref 70–99)
HBA1C MFR BLD: 5.5 % (ref ?–5.7)
HCT VFR BLD AUTO: 42.7 %
HDLC SERPL-MCNC: 84 MG/DL (ref 40–59)
HGB BLD-MCNC: 14.2 G/DL
IMM GRANULOCYTES # BLD AUTO: 0.01 X10(3) UL (ref 0–1)
IMM GRANULOCYTES NFR BLD: 0.2 %
INSULIN SERPL-ACNC: 7.2 MU/L (ref 3–25)
IRON SATN MFR SERPL: 20 %
IRON SERPL-MCNC: 84 UG/DL
LDLC SERPL CALC-MCNC: 78 MG/DL (ref ?–100)
LYMPHOCYTES # BLD AUTO: 1.19 X10(3) UL (ref 1–4)
LYMPHOCYTES NFR BLD AUTO: 26.1 %
MCH RBC QN AUTO: 30.3 PG (ref 26–34)
MCHC RBC AUTO-ENTMCNC: 33.3 G/DL (ref 31–37)
MCV RBC AUTO: 91 FL
MONOCYTES # BLD AUTO: 0.33 X10(3) UL (ref 0.1–1)
MONOCYTES NFR BLD AUTO: 7.2 %
NEUTROPHILS # BLD AUTO: 2.8 X10 (3) UL (ref 1.5–7.7)
NEUTROPHILS # BLD AUTO: 2.8 X10(3) UL (ref 1.5–7.7)
NEUTROPHILS NFR BLD AUTO: 61.5 %
NONHDLC SERPL-MCNC: 87 MG/DL (ref ?–130)
OSMOLALITY SERPL CALC.SUM OF ELEC: 290 MOSM/KG (ref 275–295)
PLATELET # BLD AUTO: 224 10(3)UL (ref 150–450)
POTASSIUM SERPL-SCNC: 4 MMOL/L (ref 3.5–5.1)
PROGEST SERPL-MCNC: 0.67 NG/ML
PROT SERPL-MCNC: 7.2 G/DL (ref 6.4–8.2)
RBC # BLD AUTO: 4.69 X10(6)UL
SODIUM SERPL-SCNC: 139 MMOL/L (ref 136–145)
T3FREE SERPL-MCNC: 2.75 PG/ML (ref 2.4–4.2)
T4 FREE SERPL-MCNC: 0.8 NG/DL (ref 0.8–1.7)
THYROGLOB SERPL-MCNC: 416 U/ML (ref ?–60)
THYROPEROXIDASE AB SERPL-ACNC: 2882 U/ML (ref ?–60)
TIBC SERPL-MCNC: 410 UG/DL (ref 240–450)
TRANSFERRIN SERPL-MCNC: 275 MG/DL (ref 200–360)
TRIGL SERPL-MCNC: 41 MG/DL (ref 30–149)
TSI SER-ACNC: 0.76 MIU/ML (ref 0.36–3.74)
VIT B12 SERPL-MCNC: 485 PG/ML (ref 193–986)
VIT D+METAB SERPL-MCNC: 29.3 NG/ML (ref 30–100)
VLDLC SERPL CALC-MCNC: 6 MG/DL (ref 0–30)
WBC # BLD AUTO: 4.6 X10(3) UL (ref 4–11)

## 2023-04-19 PROCEDURE — 82607 VITAMIN B-12: CPT | Performed by: PHYSICIAN ASSISTANT

## 2023-04-19 PROCEDURE — 84482 T3 REVERSE: CPT | Performed by: PHYSICIAN ASSISTANT

## 2023-04-19 PROCEDURE — 84439 ASSAY OF FREE THYROXINE: CPT | Performed by: PHYSICIAN ASSISTANT

## 2023-04-19 PROCEDURE — 84140 ASSAY OF PREGNENOLONE: CPT

## 2023-04-19 PROCEDURE — 83036 HEMOGLOBIN GLYCOSYLATED A1C: CPT | Performed by: PHYSICIAN ASSISTANT

## 2023-04-19 PROCEDURE — 84144 ASSAY OF PROGESTERONE: CPT

## 2023-04-19 PROCEDURE — 82670 ASSAY OF TOTAL ESTRADIOL: CPT

## 2023-04-19 PROCEDURE — 84466 ASSAY OF TRANSFERRIN: CPT | Performed by: PHYSICIAN ASSISTANT

## 2023-04-19 PROCEDURE — 82627 DEHYDROEPIANDROSTERONE: CPT

## 2023-04-19 PROCEDURE — 86376 MICROSOMAL ANTIBODY EACH: CPT | Performed by: PHYSICIAN ASSISTANT

## 2023-04-19 PROCEDURE — 84481 FREE ASSAY (FT-3): CPT | Performed by: PHYSICIAN ASSISTANT

## 2023-04-19 PROCEDURE — 84410 TESTOSTERONE BIOAVAILABLE: CPT

## 2023-04-19 PROCEDURE — 80050 GENERAL HEALTH PANEL: CPT | Performed by: PHYSICIAN ASSISTANT

## 2023-04-19 PROCEDURE — 83525 ASSAY OF INSULIN: CPT | Performed by: PHYSICIAN ASSISTANT

## 2023-04-19 PROCEDURE — 83540 ASSAY OF IRON: CPT | Performed by: PHYSICIAN ASSISTANT

## 2023-04-19 PROCEDURE — 82728 ASSAY OF FERRITIN: CPT | Performed by: PHYSICIAN ASSISTANT

## 2023-04-19 PROCEDURE — 82306 VITAMIN D 25 HYDROXY: CPT | Performed by: PHYSICIAN ASSISTANT

## 2023-04-19 PROCEDURE — 86800 THYROGLOBULIN ANTIBODY: CPT | Performed by: PHYSICIAN ASSISTANT

## 2023-04-19 PROCEDURE — 80061 LIPID PANEL: CPT | Performed by: PHYSICIAN ASSISTANT

## 2023-04-19 PROCEDURE — 82746 ASSAY OF FOLIC ACID SERUM: CPT | Performed by: PHYSICIAN ASSISTANT

## 2023-04-24 LAB — PREGNENOLONE: 39 NG/DL

## 2023-04-25 LAB
SEX HORM BIND GLOB: 76.4 NMOL/L
TESTOST % FREE+WEAK BND: 6 %
TESTOST FREE+WEAK BND: 0.9 NG/DL
TESTOSTERONE TOT /MS: 14.6 NG/DL

## 2023-05-04 LAB — REVERSE T3: 11.4 NG/DL

## 2023-05-23 ENCOUNTER — TELEPHONE (OUTPATIENT)
Dept: INTEGRATIVE MEDICINE | Facility: CLINIC | Age: 53
End: 2023-05-23

## 2023-05-23 DIAGNOSIS — E06.3 HASHIMOTO'S THYROIDITIS: ICD-10-CM

## 2023-05-23 RX ORDER — LEVOTHYROXINE, LIOTHYRONINE 38; 9 UG/1; UG/1
TABLET ORAL
Qty: 60 TABLET | Refills: 0 | Status: SHIPPED | OUTPATIENT
Start: 2023-05-23 | End: 2023-05-23

## 2023-05-23 NOTE — TELEPHONE ENCOUNTER
Pt requested 3 month supply for thyroid refills. Insurance covers 3 month supply instead of refilling month to month. Pt has to pay more out of pocket for monthly refills.

## 2023-05-23 NOTE — TELEPHONE ENCOUNTER
LOV: 4/18/23 LISA KOWALSKI follow-up in 4 weeks. Rx: 5/23/23 NP THYROID 60 MG Oral Tab TAKE 2 TABLETS BY MOUTH DAILY, # 60, refill 0. Pt is requesting 30 day supply due to insurance coverage. 90 day supply NP THYROID 60 MG Oral Tab TAKE 2 TABLETS BY MOUTH DAILY; authorize if appropriate.

## 2023-05-26 NOTE — TELEPHONE ENCOUNTER
Patient will be out of medication by next Tuesday. Clarification* Requesting 3 month supply d/t insurance coverage or will have to pay OOP.     Thank you

## 2023-05-29 RX ORDER — LEVOTHYROXINE AND LIOTHYRONINE 38; 9 UG/1; UG/1
120 TABLET ORAL DAILY
Qty: 180 TABLET | Refills: 0 | Status: SHIPPED | OUTPATIENT
Start: 2023-05-29

## 2023-06-23 NOTE — PROGRESS NOTES
Etelvina Lopez is a 52year old female who presents for a complete physical exam.     HPI:   Currently on a diet to help with her thyroid and she felt less symptoms with avoiding gluten, dairy and soy.    She is very interested in preventing Dementia/A PRINCIPAL DISCHARGE DIAGNOSIS  Diagnosis: Priapism  Assessment and Plan of Treatment: Follow up: Please call my office to schedule a post-operative appointment at the office 1-2 weeks after surgery. If you have any medical issues during your post operative care, please have your primary doctor contact the urologist prior to initiating treatment.  Discomfort:   Stitches: The stitches in the incision will dissolve and fall out by themselves. Sometimes skin stitches may open, allowing a slight gaping of the incision. This is no problem if you keep the area clean.  There is a bluish colored waterproof glue over the incision as well – the glue will peel away and fall off on its own over a couple of weeks.    Swelling, Lumps and Bumps: There are often lumps and bumps that can be felt in the scrotum on either or both sides up to two (2) months or more post operatively. These are of no concern and with time they will soften and disappear.  Any “black and blue” bruising areas will also resolve.  Normally, there is also swelling of the scrotum post operatively. Sometimes the tissue fluid which causes the swelling migrates to the penile skin and can look alarming; with time, all the swelling will eventually subside but may take weeks.  You may apply an ice-pack for 15 minutes out of every hour for the first 24 -36 hours. This can help with swelling.  Showers/Baths: Do not take showers until 48 hours after surgery. Then you may take a shower without scrubbing the incision.     Past Medical History:   Diagnosis Date   • Thyroid disease        CURRENT MEDICATIONS:     Current Outpatient Prescriptions:  Multiple Vitamins-Minerals (MULTIVITAL) Oral Tab Take 1 tablet by mouth daily.  Disp:  Rfl:    Cholecalciferol (VITAMIN D) 1000 uni Abdominal: Soft. Bowel sounds are normal. She exhibits no distension and no mass. There is no tenderness. Musculoskeletal: Normal range of motion. Lymphadenopathy:     She has no cervical adenopathy.    Neurological: She is alert and oriented to person, The products and items listed below (the “Products”)  and their claims have not been evaluated by the Food and Drug Administration. Dietary products are not intended to treat, prevent, mitigate or cure disease.  Ultimately, you must draw your own conclusion · Healthy diet including adequate intake of vegetables and fruits, appropriate portion sizes, minimizing highly concentrated carbohydrate foods      Patient affirmed understanding of plan and all questions were answered.      Rafael Felder DO PRINCIPAL DISCHARGE DIAGNOSIS  Diagnosis: Priapism  Assessment and Plan of Treatment: Follow up: Please call my office to schedule a post-operative appointment at the office 1-2 weeks after surgery. If you have any medical issues during your post operative care, please have your primary doctor contact the urologist prior to initiating treatment.  Discomfort:   Stitches: The stitches in the incision will dissolve and fall out by themselves. Sometimes skin stitches may open, allowing a slight gaping of the incision. This is no problem if you keep the area clean.  There is a bluish colored waterproof glue over the incision as well – the glue will peel away and fall off on its own over a couple of weeks.    Swelling, Lumps and Bumps: There are often lumps and bumps that can be felt in the scrotum on either or both sides up to two (2) months or more post operatively. These are of no concern and with time they will soften and disappear.  Any “black and blue” bruising areas will also resolve.  Normally, there is also swelling of the scrotum post operatively. Sometimes the tissue fluid which causes the swelling migrates to the penile skin and can look alarming; with time, all the swelling will eventually subside but may take weeks.  You may apply an ice-pack for 15 minutes out of every hour for the first 24 -36 hours. This can help with swelling.  Showers/Baths: Do not take showers until 48 hours after surgery. Then you may take a shower without scrubbing the incision.      SECONDARY DISCHARGE DIAGNOSES  Diagnosis: Narcotic addiction  Assessment and Plan of Treatment: Continue your suboxone and follow up with KELIN Howell at [facility]  Use oxycodone only for severe pain  Narcan nasal spray in case of overdose, see attached paperwork for usage and rescue breathing directions

## 2023-08-27 DIAGNOSIS — E06.3 HASHIMOTO'S THYROIDITIS: ICD-10-CM

## 2023-08-29 DIAGNOSIS — E06.3 HASHIMOTO'S THYROIDITIS: ICD-10-CM

## 2023-08-30 RX ORDER — THYROID 60 MG/1
120 TABLET ORAL DAILY
Qty: 180 TABLET | Refills: 0 | OUTPATIENT
Start: 2023-08-30

## 2023-08-30 RX ORDER — THYROID 60 MG/1
120 TABLET ORAL DAILY
Qty: 180 TABLET | Refills: 0 | Status: SHIPPED | OUTPATIENT
Start: 2023-08-30

## 2023-10-24 ENCOUNTER — LAB ENCOUNTER (OUTPATIENT)
Dept: LAB | Age: 53
End: 2023-10-24
Attending: PHYSICIAN ASSISTANT

## 2023-10-24 ENCOUNTER — OFFICE VISIT (OUTPATIENT)
Dept: INTEGRATIVE MEDICINE | Facility: CLINIC | Age: 53
End: 2023-10-24

## 2023-10-24 VITALS
WEIGHT: 172 LBS | HEART RATE: 75 BPM | DIASTOLIC BLOOD PRESSURE: 58 MMHG | OXYGEN SATURATION: 96 % | HEIGHT: 66 IN | BODY MASS INDEX: 27.64 KG/M2 | SYSTOLIC BLOOD PRESSURE: 118 MMHG

## 2023-10-24 DIAGNOSIS — R79.89 LOW VITAMIN D LEVEL: ICD-10-CM

## 2023-10-24 DIAGNOSIS — R79.0 LOW FERRITIN: ICD-10-CM

## 2023-10-24 DIAGNOSIS — N95.1 PERIMENOPAUSE: ICD-10-CM

## 2023-10-24 DIAGNOSIS — R23.2 HOT FLASHES: ICD-10-CM

## 2023-10-24 DIAGNOSIS — E06.3 HASHIMOTO'S THYROIDITIS: ICD-10-CM

## 2023-10-24 DIAGNOSIS — E06.3 HASHIMOTO'S THYROIDITIS: Primary | ICD-10-CM

## 2023-10-24 LAB
BASOPHILS # BLD AUTO: 0.04 X10(3) UL (ref 0–0.2)
BASOPHILS NFR BLD AUTO: 0.5 %
DEPRECATED HBV CORE AB SER IA-ACNC: 26 NG/ML
DEPRECATED RDW RBC AUTO: 44.2 FL (ref 35.1–46.3)
DHEA-S SERPL-MCNC: 154 UG/DL
EOSINOPHIL # BLD AUTO: 0.24 X10(3) UL (ref 0–0.7)
EOSINOPHIL NFR BLD AUTO: 3 %
ERYTHROCYTE [DISTWIDTH] IN BLOOD BY AUTOMATED COUNT: 13.2 % (ref 11–15)
HCT VFR BLD AUTO: 42.8 %
HGB BLD-MCNC: 14.9 G/DL
IMM GRANULOCYTES # BLD AUTO: 0.02 X10(3) UL (ref 0–1)
IMM GRANULOCYTES NFR BLD: 0.3 %
IRON SATN MFR SERPL: 19 %
IRON SERPL-MCNC: 87 UG/DL
LYMPHOCYTES # BLD AUTO: 1.78 X10(3) UL (ref 1–4)
LYMPHOCYTES NFR BLD AUTO: 22.6 %
MCH RBC QN AUTO: 31.4 PG (ref 26–34)
MCHC RBC AUTO-ENTMCNC: 34.8 G/DL (ref 31–37)
MCV RBC AUTO: 90.3 FL
MONOCYTES # BLD AUTO: 0.51 X10(3) UL (ref 0.1–1)
MONOCYTES NFR BLD AUTO: 6.5 %
NEUTROPHILS # BLD AUTO: 5.28 X10 (3) UL (ref 1.5–7.7)
NEUTROPHILS # BLD AUTO: 5.28 X10(3) UL (ref 1.5–7.7)
NEUTROPHILS NFR BLD AUTO: 67.1 %
PLATELET # BLD AUTO: 269 10(3)UL (ref 150–450)
RBC # BLD AUTO: 4.74 X10(6)UL
T3FREE SERPL-MCNC: 3.43 PG/ML (ref 2.4–4.2)
T4 FREE SERPL-MCNC: 0.8 NG/DL (ref 0.8–1.7)
TIBC SERPL-MCNC: 448 UG/DL (ref 240–450)
TRANSFERRIN SERPL-MCNC: 301 MG/DL (ref 200–360)
TSI SER-ACNC: 2.13 MIU/ML (ref 0.36–3.74)
VIT D+METAB SERPL-MCNC: 32.9 NG/ML (ref 30–100)
WBC # BLD AUTO: 7.9 X10(3) UL (ref 4–11)

## 2023-10-24 PROCEDURE — 84466 ASSAY OF TRANSFERRIN: CPT | Performed by: PHYSICIAN ASSISTANT

## 2023-10-24 PROCEDURE — 99214 OFFICE O/P EST MOD 30 MIN: CPT | Performed by: PHYSICIAN ASSISTANT

## 2023-10-24 PROCEDURE — 82627 DEHYDROEPIANDROSTERONE: CPT | Performed by: PHYSICIAN ASSISTANT

## 2023-10-24 PROCEDURE — 82306 VITAMIN D 25 HYDROXY: CPT | Performed by: PHYSICIAN ASSISTANT

## 2023-10-24 PROCEDURE — 82728 ASSAY OF FERRITIN: CPT | Performed by: PHYSICIAN ASSISTANT

## 2023-10-24 PROCEDURE — 85025 COMPLETE CBC W/AUTO DIFF WBC: CPT | Performed by: PHYSICIAN ASSISTANT

## 2023-10-24 PROCEDURE — 3008F BODY MASS INDEX DOCD: CPT | Performed by: PHYSICIAN ASSISTANT

## 2023-10-24 PROCEDURE — 84439 ASSAY OF FREE THYROXINE: CPT | Performed by: PHYSICIAN ASSISTANT

## 2023-10-24 PROCEDURE — 83540 ASSAY OF IRON: CPT | Performed by: PHYSICIAN ASSISTANT

## 2023-10-24 PROCEDURE — 84481 FREE ASSAY (FT-3): CPT | Performed by: PHYSICIAN ASSISTANT

## 2023-10-24 PROCEDURE — 3074F SYST BP LT 130 MM HG: CPT | Performed by: PHYSICIAN ASSISTANT

## 2023-10-24 PROCEDURE — 84443 ASSAY THYROID STIM HORMONE: CPT | Performed by: PHYSICIAN ASSISTANT

## 2023-10-24 PROCEDURE — 84140 ASSAY OF PREGNENOLONE: CPT | Performed by: PHYSICIAN ASSISTANT

## 2023-10-24 PROCEDURE — 3078F DIAST BP <80 MM HG: CPT | Performed by: PHYSICIAN ASSISTANT

## 2023-10-24 PROCEDURE — 84482 T3 REVERSE: CPT | Performed by: PHYSICIAN ASSISTANT

## 2023-10-30 LAB — REVERSE T3: 11.7 NG/DL

## 2023-11-03 LAB — PREGNENOLONE: 14 NG/DL

## 2023-11-27 DIAGNOSIS — E06.3 HASHIMOTO'S THYROIDITIS: ICD-10-CM

## 2023-11-27 RX ORDER — THYROID 60 MG/1
120 TABLET ORAL DAILY
Qty: 180 TABLET | Refills: 1 | Status: SHIPPED | OUTPATIENT
Start: 2023-11-27

## 2023-11-27 NOTE — TELEPHONE ENCOUNTER
A refill request was received for:  Requested Prescriptions     Pending Prescriptions Disp Refills    thyroid (NP THYROID) 60 MG Oral Tab 180 tablet 0     Sig: Take 2 tablets (120 mg total) by mouth daily. Last refill date:  8/30/23  Qty: 180  Dx: Hypothyroidism  Last office visit: 10/24/23   When is follow up due: 3 months (1/24/24)     For hormone prescriptions, date of last blood test for rx being requested:10/24/23    No future appointments. Pharmacy changed per the pt's request to [facility] mail delivery. NP Thyroid 60 mg, # 180 pended; authorize if appropriate.

## 2023-11-27 NOTE — TELEPHONE ENCOUNTER
Patient contacted clinic regarding refill of NP Thyroid - needs through [facility] mail order service. Request is being sent to our office, please confirm if we have received.      Thank you

## 2024-02-29 DIAGNOSIS — E06.3 HASHIMOTO'S THYROIDITIS: ICD-10-CM

## 2024-03-04 DIAGNOSIS — E06.3 HASHIMOTO'S THYROIDITIS: ICD-10-CM

## 2024-03-04 RX ORDER — THYROID 60 MG/1
120 TABLET ORAL DAILY
Qty: 180 TABLET | Refills: 1 | Status: SHIPPED | OUTPATIENT
Start: 2024-03-04

## 2024-03-05 RX ORDER — THYROID 60 MG/1
120 TABLET ORAL DAILY
Qty: 180 TABLET | Refills: 1 | OUTPATIENT
Start: 2024-03-05

## 2024-05-29 ENCOUNTER — OFFICE VISIT (OUTPATIENT)
Dept: INTEGRATIVE MEDICINE | Facility: CLINIC | Age: 54
End: 2024-05-29

## 2024-05-29 VITALS
HEART RATE: 75 BPM | SYSTOLIC BLOOD PRESSURE: 108 MMHG | DIASTOLIC BLOOD PRESSURE: 60 MMHG | BODY MASS INDEX: 27.97 KG/M2 | HEIGHT: 66 IN | WEIGHT: 174 LBS | OXYGEN SATURATION: 98 %

## 2024-05-29 DIAGNOSIS — E06.3 HYPOTHYROIDISM DUE TO HASHIMOTO'S THYROIDITIS: Primary | ICD-10-CM

## 2024-05-29 DIAGNOSIS — E03.8 HYPOTHYROIDISM DUE TO HASHIMOTO'S THYROIDITIS: Primary | ICD-10-CM

## 2024-05-29 DIAGNOSIS — E06.3 HASHIMOTO'S THYROIDITIS: ICD-10-CM

## 2024-05-29 DIAGNOSIS — E61.1 LOW IRON: ICD-10-CM

## 2024-05-29 DIAGNOSIS — R79.89 LOW VITAMIN D LEVEL: ICD-10-CM

## 2024-05-29 DIAGNOSIS — N95.1 PERIMENOPAUSAL: ICD-10-CM

## 2024-05-29 PROCEDURE — 3074F SYST BP LT 130 MM HG: CPT | Performed by: PHYSICIAN ASSISTANT

## 2024-05-29 PROCEDURE — 99214 OFFICE O/P EST MOD 30 MIN: CPT | Performed by: PHYSICIAN ASSISTANT

## 2024-05-29 PROCEDURE — 3078F DIAST BP <80 MM HG: CPT | Performed by: PHYSICIAN ASSISTANT

## 2024-05-29 PROCEDURE — 3008F BODY MASS INDEX DOCD: CPT | Performed by: PHYSICIAN ASSISTANT

## 2024-05-29 RX ORDER — THYROID 60 MG/1
120 TABLET ORAL DAILY
Qty: 180 TABLET | Refills: 1 | Status: SHIPPED | OUTPATIENT
Start: 2024-05-29

## 2024-05-29 RX ORDER — ESTRADIOL 0.04 MG/D
PATCH, EXTENDED RELEASE TRANSDERMAL
COMMUNITY
Start: 2024-03-05

## 2024-05-29 RX ORDER — PROGESTERONE 200 MG/1
CAPSULE ORAL
COMMUNITY
Start: 2024-03-15

## 2024-05-29 NOTE — PROGRESS NOTES
Gladys Botello is a 53 year old female.  Chief Complaint   Patient presents with    Follow - Up     Thyroid        HPI:   53-year-old with known history of Hashimoto's hypothyroidism presents today for follow-up.    Last labs: 10/24/23    Thyroid - brain fog, weight loss resistance. No cold intolerance or constipation. No hoarse voice or dysphagia.     Perimenopause - seems to have menses every 3mos. Started Estrogen Patch and oral Progesterone in March through online menopause clinic- has helped with hot/flashes and night sweats, sleeping better.   May start DHEA and creatine. Would like DHEA level checked to see if she should proceed with taking.   Started with .     Lifestyle Factors affecting health:  Diet - GF, DF, soy free. Working well on increasing protein.  Started mindful eating with Dr. Lucas Hickman  Good amount of water, reduction in alcohol   THC at times  Exercise - starting with     Stress - high, child having psych issues,  travels for work.  Relievers - walk daily with dog with podcast    Sleep - better, less interrupted    Cutting back on the smoking    --------------------------------------------Last OV---------------------------------------------      Perimenopausal-April labs revealed low progesterone and patient is experiencing vasomotor symptoms and night sweats, low libido. Menses now irregular - has not had August. Cycles had been shortening, heavier than normal.  High stress in the last year.  Did not start Vitex yet.    Hashimoto's hypothyroidism-April labs revealed increased antibodies despite eating gluten-free, dairy free, and soy free.  Currently on NP thyroid 60 mg. Denies constipation, cold intolerance, hair loss.     Is complaining of fatigue and weight loss still, but very stressed. Weight increased 10lbs quickly at end of summer.     Started Ashwagnadha x3mos, not much difference.    Low vitamin D-has been taking drops but unsure how much, daily  and Magnesium.     Low ferritin- did not get the iron yet.       Nicole's MultiVit - can tell when she doesn't take  Trying to cut back on smoking    REVIEW OF SYSTEMS:   Review of Systems     Negative except for pertinent ROS in HPI    FAMILY HISTORY:      Family History   Problem Relation Age of Onset    Cancer Father         Colon, liver, lung    Colon Cancer Father     Other (alzheimers) Mother        MEDICAL HISTORY:     Past Medical History:    Celiac disease (HCC)    Gluten sensitivity,  never confirmed with test    Colon polyp    Disorder of thyroid    Hemorrhoids    Thyroid disease       CURRENT MEDICATIONS:     Current Outpatient Medications   Medication Sig Dispense Refill    Estradiol 0.0375 MG/24HR Transdermal Patch Biweekly APPLY 1 PATCH TO BIKINI AREA 2 TIMES WEEKLY. ALTERNATE APPLICATION SIDES      progesterone 200 MG Oral Cap       thyroid (NP THYROID) 60 MG Oral Tab Take 2 tablets (120 mg total) by mouth daily. 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[employer]    Social History     Socioeconomic History    Marital status:    Tobacco Use    Smoking status: Former     Current packs/day: 0.00     Types: Cigarettes     Start date: 1991     Quit date: 2006     Years since quittin.4    Smokeless tobacco: Never   Vaping Use    Vaping status: Never Used   Substance and Sexual Activity    Alcohol use: Yes     Alcohol/week: 1.0 standard drink of alcohol     Types: 1 Standard drinks or equivalent per week     Comment: 1 qd    Drug use: Yes     Types: Cannabis     Comment: Occasional marijuana   Social History Narrative    Work - rental property        2 children     Social Determinants of Health      [facility]    Social Connections     [facility]    Housing Stability       SURGICAL HISTORY:     Past Surgical History:   Procedure Laterality Date    Colonoscopy  2010    Colonoscopy      Colonoscopy N/A 8/13/2019    Procedure: COLONOSCOPY;  Surgeon: Walker Stokes MD;  Location: [facility] ENDO    Needle biopsy right      benign    Patient denies any surgical history         PHYSICAL EXAM:     Vitals:    05/29/24 1429   BP: 108/60   BP Location: Right arm   Patient Position: Sitting   Cuff Size: adult   Pulse: 75   SpO2: 98%   Weight: 174 lb (78.9 kg)   Height: 5' 6\" (1.676 m)         Physical Exam  Vitals reviewed.   Constitutional:       General: She is not in acute distress.     Appearance: Normal appearance.   HENT:      Mouth/Throat:      Mouth: Mucous membranes are moist.      Pharynx: Oropharynx is clear.   Eyes:      Extraocular Movements: Extraocular movements intact.      Conjunctiva/sclera: Conjunctivae normal.   Neck:      Thyroid: No thyromegaly (slight).   Cardiovascular:      Rate and Rhythm: Normal rate and regular rhythm.      Heart sounds: Normal heart sounds.   Pulmonary:      Effort: Pulmonary effort is normal.      Breath sounds: Normal breath sounds.   Musculoskeletal:         General: No swelling or deformity.   Skin:     General: Skin is warm and dry.   Neurological:      General: No focal deficit present.      Mental Status: She is alert and oriented to person, place, and time.   Psychiatric:         Mood and Affect: Mood normal.          ASSESSMENT AND PLAN:     No visits with results within 6 Month(s) from this visit.   Latest known visit with results is:   [facility] Lab Encounter on 10/24/2023   Component Date Value Ref Range Status    Iron 10/24/2023 87  50 - 170 ug/dL Final    Transferrin 10/24/2023 301  200 - 360 mg/dL Final    Total Iron Binding Capacity 10/24/2023 448  240 - 450 ug/dL Final    % Saturation 10/24/2023 19  15 - 50 % Final    Ferritin 10/24/2023  26.0  18.0 - 340.0 ng/mL Final    This test may exhibit interference when a sample is collected from a person who is consuming high dose of biotin (a.k.a., vitamin B7, vitamin H, coenzyme R) supplements resulting in serum concentrations >100 ng/mL.  Intake of the recommended daily allowance (RDA) for biotin (0.03 mg) has not been shown to typically cause significant interference; however, high dose daily dietary supplements may contain biotin concentrations greater than 150 times (5-10 mg) the RDA.  It is recommended that physicians ask all patients who may be on biotin supplementation to stop biotin consumption at least 72 hours prior to collection of a new sample.      DHEA Sulfate 10/24/2023 154.0  25.9 - 460.2 ug/dL Final    This test may exhibit interference of greater than 10% when a sample is collected from a person who is consuming high dose of biotin (a.k.a., vitamin B7, vitamin H, coenzyme R) supplements resulting in serum concentrations &gt;12.5 ng/mL, leading to either falsely elevated or falsely depressed results.  Intake of the recommended daily allowance (RDA) for biotin (0.03 mg) has not been shown to typically cause significant interference; however, high dose daily dietary supplements may contain biotin concentrations greater than 150 times (5-10 mg) the RDA.  It is recommended that physicians ask all patients who may be on biotin supplementation to stop biotin consumption at least 72 hours prior to collection of a new sample.    Pregnenolone 10/24/2023 14  ng/dL Final    This test was developed and its performance characteristics  determined by [employer] It has not been cleared or approved  by the Food and Drug Administration.  Reference Range:  Adults: <151    TSH 10/24/2023 2.130  0.358 - 3.740 mIU/mL Final    This test may exhibit interference when a sample is collected from a person who is consuming high dose of biotin (a.k.a., vitamin B7, vitamin H, coenzyme R) supplements resulting in serum  concentrations >100 ng/mL.  Intake of the recommended daily allowance (RDA) for biotin (0.03 mg) has not been shown to typically cause significant interference; however, high dose daily dietary supplements may contain biotin concentrations greater than 150 times (5-10 mg) the RDA.  It is recommended that physicians ask all patients who may be on biotin supplementation to stop biotin consumption at least 72 hours prior to collection of a new sample.      Free T4 10/24/2023 0.8  0.8 - 1.7 ng/dL Final    If applicable: Pregnancy Reference Intervals  First trimester 10-13 weeks gestation    0.9-1.4 ng/dL  Second trimester 14-26 weeks gestation   0.7-1.3 ng/dL      This test may exhibit interference when a sample is collected from a person who is consuming high dose of biotin (a.k.a., vitamin B7, vitamin H, coenzyme R) supplements resulting in serum concentrations >100 ng/mL.  Intake of the recommended daily allowance (RDA) for biotin (0.03 mg) has not been shown to typically cause significant interference; however, high dose daily dietary supplements may contain biotin concentrations greater than 150 times (5-10 mg) the RDA.  It is recommended that physicians ask all patients who may be on biotin supplementation to stop biotin consumption at least 72 hours prior to collection of a new sample.      T3 Free 10/24/2023 3.43  2.40 - 4.20 pg/mL Final    This test may exhibit interference when a sample is collected from a person who is consuming high dose of biotin (a.k.a., vitamin B7, vitamin H, coenzyme R) supplements resulting in serum concentrations >100 ng/mL.  Intake of the recommended daily allowance (RDA) for biotin (0.03 mg) has not been shown to typically cause significant interference; however, high dose daily dietary supplements may contain biotin concentrations greater than 150 times (5-10 mg) the RDA.  It is recommended that physicians ask all patients who may be on biotin supplementation to stop biotin  consumption at least 72 hours prior to collection of a new sample.      Reverse T3 10/24/2023 11.7  9.2 - 24.1 ng/dL Final    WBC 10/24/2023 7.9  4.0 - 11.0 x10(3) uL Final    RBC 10/24/2023 4.74  3.80 - 5.30 x10(6)uL Final    HGB 10/24/2023 14.9  12.0 - 16.0 g/dL Final    HCT 10/24/2023 42.8  35.0 - 48.0 % Final    MCV 10/24/2023 90.3  80.0 - 100.0 fL Final    MCH 10/24/2023 31.4  26.0 - 34.0 pg Final    MCHC 10/24/2023 34.8  31.0 - 37.0 g/dL Final    RDW-SD 10/24/2023 44.2  35.1 - 46.3 fL Final    RDW 10/24/2023 13.2  11.0 - 15.0 % Final    PLT 10/24/2023 269.0  150.0 - 450.0 10(3)uL Final    Neutrophil Absolute Prelim 10/24/2023 5.28  1.50 - 7.70 x10 (3) uL Final    Neutrophil Absolute 10/24/2023 5.28  1.50 - 7.70 x10(3) uL Final    Lymphocyte Absolute 10/24/2023 1.78  1.00 - 4.00 x10(3) uL Final    Monocyte Absolute 10/24/2023 0.51  0.10 - 1.00 x10(3) uL Final    Eosinophil Absolute 10/24/2023 0.24  0.00 - 0.70 x10(3) uL Final    Basophil Absolute 10/24/2023 0.04  0.00 - 0.20 x10(3) uL Final    Immature Granulocyte Absolute 10/24/2023 0.02  0.00 - 1.00 x10(3) uL Final    Neutrophil % 10/24/2023 67.1  % Final    Lymphocyte % 10/24/2023 22.6  % Final    Monocyte % 10/24/2023 6.5  % Final    Eosinophil % 10/24/2023 3.0  % Final    Basophil % 10/24/2023 0.5  % Final    Immature Granulocyte % 10/24/2023 0.3  % Final    Vitamin D, 25OH, Total 10/24/2023 32.9  30.0 - 100.0 ng/mL Final    Literature Recommendations for 25(OH)D levels are:  Range           Vitamin D Status   <20    ng/mL      Deficiency   20-<30 ng/mL      Insufficiency    ng/mL      Sufficiency   >100   ng/mL      Toxicity    *Clinical controversy exists regarding optimal 25(OH)D levels. Emerging evidence links potential adverse effects to high levels, particularly >60 ng/mL.          No results found.    1. Hypothyroidism due to Hashimoto's thyroiditis  - Dehydroepiandrosterone Sulfate; Future  - Pregnenolone by MS/MS, Serum; Future  - Assay,  Thyroid Stim Hormone; Future  - Free T4, (Free Thyroxine); Future  - Thyroid Antithyroglobulin AB; Future  - Thyroid Peroxidase (TPO) AB; Future  - Reverse T3, Serum; Future  - Free T3 (Triiodothryronine); Future    2. Perimenopausal  - Dehydroepiandrosterone Sulfate; Future  - Pregnenolone by MS/MS, Serum; Future  - Assay, Thyroid Stim Hormone; Future  - Free T4, (Free Thyroxine); Future  - Thyroid Antithyroglobulin AB; Future  - Thyroid Peroxidase (TPO) AB; Future  - Reverse T3, Serum; Future  - Free T3 (Triiodothryronine); Future    3. Low vitamin D level  - Vitamin D; Future    4. Low iron  - Iron And Tibc; Future  - Ferritin; Future    5. Hashimoto's thyroiditis  - thyroid (NP THYROID) 60 MG Oral Tab; Take 2 tablets (120 mg total) by mouth daily.  Dispense: 180 tablet; Refill: 1        Time spent with patient: Over 30 minutes spent in chart review and in direct communication with patient obtaining and reviewing history, creating a unique care plan, explaining the rationale for treatment, reviewing potential SE and overall treatment plan,  documenting all clinical information in Epic. Over 50% of this time was in education, counseling and coordination of care.     Problem List Items Addressed This Visit          Endocrine and Metabolic    Hashimoto's thyroiditis    Relevant Medications    thyroid (NP THYROID) 60 MG Oral Tab    Hypothyroidism - Primary    Overview     Formatting of this note might be different from the original.  IMO Load - [id number]         Relevant Medications    thyroid (NP THYROID) 60 MG Oral Tab    Other Relevant Orders    Dehydroepiandrosterone Sulfate    Pregnenolone by MS/MS, Serum    Assay, Thyroid Stim Hormone    Free T4, (Free Thyroxine)    Thyroid Antithyroglobulin AB    Thyroid Peroxidase (TPO) AB    Reverse T3, Serum    Free T3 (Triiodothryronine)     Other Visit Diagnoses       Perimenopausal        Relevant Medications    Estradiol 0.0375 MG/24HR Transdermal Patch Biweekly     progesterone 200 MG Oral Cap    Other Relevant Orders    Dehydroepiandrosterone Sulfate    Pregnenolone by MS/MS, Serum    Assay, Thyroid Stim Hormone    Free T4, (Free Thyroxine)    Thyroid Antithyroglobulin AB    Thyroid Peroxidase (TPO) AB    Reverse T3, Serum    Free T3 (Triiodothryronine)    Low vitamin D level        Relevant Medications    Estradiol 0.0375 MG/24HR Transdermal Patch Biweekly    progesterone 200 MG Oral Cap    thyroid (NP THYROID) 60 MG Oral Tab    Other Relevant Orders    Vitamin D    Low iron        Relevant Orders    Iron And Tibc    Ferritin          Endocrine:  Perimenopausal -menses now every 3 months.  Started on BHRT with online clinic.  -> Added DHEA and Pregnenolone to lab order per patient's request as she was told to start DHEA supplementation.    Hashimoto's -stable on NP thyroid. Fatigue and weight issues persist, but improving with recent addition of BHRT.  -> New lab order placed today    Low Vit D -will check levels again today.    Low ferritin -was able to stop supplementation  -> Will recheck     Given further recommendations as below    Orders Placed This Visit:  Orders Placed This Encounter   Procedures    Dehydroepiandrosterone Sulfate    Pregnenolone by MS/MS, Serum    Assay, Thyroid Stim Hormone    Free T4, (Free Thyroxine)    Thyroid Antithyroglobulin AB    Thyroid Peroxidase (TPO) AB    Reverse T3, Serum    Free T3 (Triiodothryronine)    Vitamin D    Iron And Tibc    Ferritin     No orders of the defined types were placed in this encounter.      Patient Instructions   Get labs drawn before 9am     Return in about 6 months (around 11/29/2024) for x30min - thyroid.    Patient affirmed understanding of plan and all questions were answered.     Maya Allison PA-C

## 2024-05-30 ENCOUNTER — LAB ENCOUNTER (OUTPATIENT)
Dept: LAB | Age: 54
End: 2024-05-30
Attending: PHYSICIAN ASSISTANT

## 2024-05-30 DIAGNOSIS — N95.1 PERIMENOPAUSAL: ICD-10-CM

## 2024-05-30 DIAGNOSIS — E03.8 HYPOTHYROIDISM DUE TO HASHIMOTO'S THYROIDITIS: ICD-10-CM

## 2024-05-30 DIAGNOSIS — E06.3 HYPOTHYROIDISM DUE TO HASHIMOTO'S THYROIDITIS: ICD-10-CM

## 2024-05-30 DIAGNOSIS — R79.89 LOW VITAMIN D LEVEL: ICD-10-CM

## 2024-05-30 DIAGNOSIS — E61.1 LOW IRON: ICD-10-CM

## 2024-05-30 LAB
DEPRECATED HBV CORE AB SER IA-ACNC: 20.8 NG/ML
DHEA-S SERPL-MCNC: 189.5 UG/DL
IRON SATN MFR SERPL: 32 %
IRON SERPL-MCNC: 129 UG/DL
T3FREE SERPL-MCNC: 2.68 PG/ML (ref 2.4–4.2)
T4 FREE SERPL-MCNC: 0.8 NG/DL (ref 0.8–1.7)
THYROGLOB SERPL-MCNC: 379 U/ML (ref ?–60)
THYROPEROXIDASE AB SERPL-ACNC: 3850 U/ML (ref ?–60)
TIBC SERPL-MCNC: 405 UG/DL (ref 250–425)
TRANSFERRIN SERPL-MCNC: 272 MG/DL (ref 250–380)
TSI SER-ACNC: 0.65 MIU/ML (ref 0.55–4.78)
VIT D+METAB SERPL-MCNC: 41.2 NG/ML (ref 30–100)

## 2024-05-30 PROCEDURE — 83540 ASSAY OF IRON: CPT | Performed by: PHYSICIAN ASSISTANT

## 2024-05-30 PROCEDURE — 84481 FREE ASSAY (FT-3): CPT | Performed by: PHYSICIAN ASSISTANT

## 2024-05-30 PROCEDURE — 84482 T3 REVERSE: CPT | Performed by: PHYSICIAN ASSISTANT

## 2024-05-30 PROCEDURE — 84140 ASSAY OF PREGNENOLONE: CPT | Performed by: PHYSICIAN ASSISTANT

## 2024-05-30 PROCEDURE — 86376 MICROSOMAL ANTIBODY EACH: CPT | Performed by: PHYSICIAN ASSISTANT

## 2024-05-30 PROCEDURE — 84466 ASSAY OF TRANSFERRIN: CPT | Performed by: PHYSICIAN ASSISTANT

## 2024-05-30 PROCEDURE — 82728 ASSAY OF FERRITIN: CPT | Performed by: PHYSICIAN ASSISTANT

## 2024-05-30 PROCEDURE — 86800 THYROGLOBULIN ANTIBODY: CPT | Performed by: PHYSICIAN ASSISTANT

## 2024-05-30 PROCEDURE — 82627 DEHYDROEPIANDROSTERONE: CPT | Performed by: PHYSICIAN ASSISTANT

## 2024-05-30 PROCEDURE — 84439 ASSAY OF FREE THYROXINE: CPT | Performed by: PHYSICIAN ASSISTANT

## 2024-05-30 PROCEDURE — 84443 ASSAY THYROID STIM HORMONE: CPT | Performed by: PHYSICIAN ASSISTANT

## 2024-05-30 PROCEDURE — 82306 VITAMIN D 25 HYDROXY: CPT | Performed by: PHYSICIAN ASSISTANT

## 2024-06-03 LAB — REVERSE T3: <5 NG/DL

## 2024-06-07 LAB — PREGNENOLONE: 26 NG/DL

## 2024-06-13 ENCOUNTER — TELEPHONE (OUTPATIENT)
Facility: CLINIC | Age: 54
End: 2024-06-13

## 2024-06-13 NOTE — TELEPHONE ENCOUNTER
----- Message from Rachel FOURNIER sent at 2019  3:00 PM CDT -----  Regardin year colonoscopy recall  5  Year colonoscopy recall placed in system per Dr. Stokes  . Colonoscopy done on 19  . Next due on 24 . Snapshot updated.

## 2024-09-24 ENCOUNTER — TELEPHONE (OUTPATIENT)
Facility: CLINIC | Age: 54
End: 2024-09-24

## 2024-09-24 DIAGNOSIS — Z86.010 HISTORY OF COLON POLYPS: ICD-10-CM

## 2024-09-24 DIAGNOSIS — Z12.11 COLON CANCER SCREENING: Primary | ICD-10-CM

## 2024-09-24 NOTE — TELEPHONE ENCOUNTER
Left message to call back.    Last Procedure, Date, MD:  colonoscopy 08/13/2019  Last Diagnosis:  colon polyps x 2  - internal hemorrhoids  Recalled (mth/yrs): 5 years  Sedation Used Previously:  MAC  Last Prep Used (if known):  colyte  Quality Of Prep (if known): good  Anticoagulants:  Diuretics:   Diabetic Med's (PO/Injectables):   Weight loss Med's:  Iron/Herbal/Multivitamin Supplements (RX/OTC):  Marijuana/Vaping/CBD:  Height & Weight:5'6/174  BMI:28.1  Hx of Cardiac/CVA Issues/(MI/Stroke):  Devices Pacemaker/Defibrillator/Stents:  Respiratory Issues/Oxygen Use/JUAN MANUEL/COPD:  Issues w/ Anesthesia:    Symptoms (Y/N):   Symptoms Details:     Special Comments/Notes:    Please advise on orders and prep.     Thank you!      Operative Report signed by Walekr Stokes MD at 8/13/2019  9:13 AM  Version 1 of 1  Author: Walker Stokes MD Service: Gastroenterology Author Type: Physician   Filed: 8/13/2019  9:13 AM Date of Service: 8/13/2019  9:10 AM Status: Signed   : Walker Stokes MD (Physician)   [facility] Endoscopy Report        Preoperative Diagnosis:  - family history of colon cancer  - personal history of colon polyps        Postoperative Diagnosis:  - colon polyps x 2  - internal hemorrhoids        Procedure:    Colonoscopy         Surgeon:  Walker Stokes M.D.     Anesthesia:  MAC sedation, see anesthesia records.      Technique:  After informed consent, the patient was placed in the left lateral recumbent position.  Digital rectal examination revealed no palpable intraluminal abnormalities.  An Olympus variable stiffness 190 series HD colonoscope was inserted into the rectum and advanced under direct vision by following the lumen to the cecum.  The colon was examined upon withdrawal in the left lateral position.     The procedure was well tolerated without immediate complication.        Findings:  The preparation of the colon was good.  The terminal ileum was examined for 4 cm and  visually normal.  The ileocecal valve was well preserved. The visualized colonic mucosa from the cecum to the anal verge was normal with an intact vascular pattern.     Colon polyps x 2 removed as follows;  - cecum x 1, diminutive removed by cold forceps  - descending x1,diminutive removed by cold forceps.   Both polypectomy sites inspected and found to be free of bleeding, specimens sent to pathology.      Small internal hemorrhoids.      Impression:  - colon polyps x 2  - internal hemorrhoids     Recommendations:  - Post polypectomy instructions given  - Repeat colonoscopy in 5 years  - Symptomatic treatment of hemorrhoids              Walker Stokes MD  8/13/2019  9:10 AM           Final Diagnosis:      A. Cecal polyp:  Sessile serrated adenoma.     B. Descending colon polyp:  Polypoid fragment of colonic mucosa.  No adenomatous change identified.

## 2024-09-27 ENCOUNTER — PATIENT MESSAGE (OUTPATIENT)
Dept: OBGYN CLINIC | Facility: CLINIC | Age: 54
End: 2024-09-27

## 2024-09-27 DIAGNOSIS — Z12.31 SCREENING MAMMOGRAM, ENCOUNTER FOR: Primary | ICD-10-CM

## 2024-09-27 NOTE — TELEPHONE ENCOUNTER
From: Gladys Botello  To: Aundrea Parisi  Sent: 9/27/2024 12:24 PM CDT  Subject: Mammogram referal    Hi, I need to get a referral to get a mammogram done. I have an appointment scheduled with you in November, I am trying to schedule at [facility] before that date.     Can a referral be sent to them at fax# [phone]     Thank you

## 2024-09-27 NOTE — TELEPHONE ENCOUNTER
Scheduled for:  Colonoscopy 14018  Provider Name:  Dr. Stokes  Date:  1/14/2025  Location:   [facility]  Sedation:  MAC  Time:  9:00 AM - Patient is aware [facility] will call the day before with arrival time.  Prep:  Golytely  Meds/Allergies Reconciled?:  Physician reviewed   Diagnosis with codes:  Colon cancer screening Z12.11; Hx: Colon polyps Z86.010  Was patient informed to call insurance with codes (Y/N):  Yes, I confirmed [employer] PPO insurance with the patient.   Referral sent?:  Referral was sent at the time of electronic surgical scheduling.   [facility] or [facility] notified?:  I sent an electronic request to Endo Scheduling and received a confirmation today.   Medication Orders: Hold multivitamins/supplements one week prior to procedure.  Misc Orders:  N/A     Further instructions given by staff: I discussed the prep instructions with the patient which she verbally understood and is aware that I will send the instructions today.

## 2024-11-12 DIAGNOSIS — E06.3 HASHIMOTO'S THYROIDITIS: ICD-10-CM

## 2024-11-13 RX ORDER — LEVOTHYROXINE, LIOTHYRONINE 38; 9 UG/1; UG/1
120 TABLET ORAL DAILY
Qty: 180 TABLET | Refills: 1 | Status: SHIPPED | OUTPATIENT
Start: 2024-11-13

## 2024-11-13 NOTE — TELEPHONE ENCOUNTER
A refill request was received for:  Requested Prescriptions     Pending Prescriptions Disp Refills    NP THYROID 60 MG Oral Tab 180 tablet 1     Sig: Take 2 tablets (120 mg total) by mouth daily.     Last refill date:  6/12/24   Qty: 180 and 1   Dx: hypothyroidism   Last office visit: 5/29/24   When is follow up due: 11/29/24       Future Appointments   Date Time Provider Department Center   11/19/2024 10:30 AM Aundrea Parisi MD [facility] 10 OP [facility] OP   1/14/2025  9:00 AM RICARDA, PROCEDURE [id number] None

## 2024-11-19 ENCOUNTER — OFFICE VISIT (OUTPATIENT)
Dept: OBGYN CLINIC | Facility: CLINIC | Age: 54
End: 2024-11-19
Payer: COMMERCIAL

## 2024-11-19 VITALS
SYSTOLIC BLOOD PRESSURE: 112 MMHG | WEIGHT: 164.88 LBS | HEIGHT: 66 IN | DIASTOLIC BLOOD PRESSURE: 60 MMHG | BODY MASS INDEX: 26.5 KG/M2

## 2024-11-19 DIAGNOSIS — Z01.419 WOMEN'S ANNUAL ROUTINE GYNECOLOGICAL EXAMINATION: Primary | ICD-10-CM

## 2024-11-19 DIAGNOSIS — Z79.890 HORMONE REPLACEMENT THERAPY (HRT): ICD-10-CM

## 2024-11-19 PROCEDURE — 99396 PREV VISIT EST AGE 40-64: CPT | Performed by: OBSTETRICS & GYNECOLOGY

## 2024-11-19 PROCEDURE — 3008F BODY MASS INDEX DOCD: CPT | Performed by: OBSTETRICS & GYNECOLOGY

## 2024-11-19 PROCEDURE — 3074F SYST BP LT 130 MM HG: CPT | Performed by: OBSTETRICS & GYNECOLOGY

## 2024-11-19 PROCEDURE — 99459 PELVIC EXAMINATION: CPT | Performed by: OBSTETRICS & GYNECOLOGY

## 2024-11-19 PROCEDURE — 3078F DIAST BP <80 MM HG: CPT | Performed by: OBSTETRICS & GYNECOLOGY

## 2024-11-19 PROCEDURE — 99214 OFFICE O/P EST MOD 30 MIN: CPT | Performed by: OBSTETRICS & GYNECOLOGY

## 2024-11-19 RX ORDER — ESTRADIOL 0.05 MG/D
PATCH, EXTENDED RELEASE TRANSDERMAL
COMMUNITY
Start: 2024-08-13 | End: 2024-11-19

## 2024-11-19 RX ORDER — ESTRADIOL 0.05 MG/D
1 PATCH, EXTENDED RELEASE TRANSDERMAL
Qty: 24 PATCH | Refills: 3 | Status: SHIPPED | OUTPATIENT
Start: 2024-11-19 | End: 2025-11-14

## 2024-11-19 RX ORDER — PROGESTERONE 200 MG/1
200 CAPSULE ORAL NIGHTLY
Qty: 90 CAPSULE | Refills: 3 | Status: SHIPPED | OUTPATIENT
Start: 2024-11-19

## 2024-11-19 NOTE — PROGRESS NOTES
GYN ANNUAL    2024  10:48 AM    Chief Complaint   Patient presents with    Annual     Annual exam   .    HPI: Patient is a 54 year old  LMP 24 presents for annual gyn exam. Last seen 2024 by  [facility] when hormones ordered as follow up after starting HRT prescribed by online menopause clinic - requesting refill. Describes taking nightly progesterone irregularly - stressed importance of avoiding unopposed estrogen due to risk for endometrial hyperplasia/carcinoma and counseled to take progesterone at same time of daily thyroid medication which she never misses. Reports marked improvement in menopausal symptoms. Encouraged to follow up with [facility] as planned.     OB History    Para Term  AB Living   4 2 2   2 2   SAB IAB Ectopic Multiple Live Births   2       2      # Outcome Date GA Lbr Young/2nd Weight Sex Type Anes PTL Lv   4 SAB      SAB      3 SAB      SAB      2 Term      NORMAL SPONT  N INEZ   1 Term      NORMAL SPONT  N INEZ         GYN hx:    Hx Prior Abnormal Pap: No  Pap Date: 22  Pap Result Notes: wnl/-hpv  Follow Up Recommendation: last mammo: 10/25/2024 at [facility]  CONTRACEPTION: None  LAST MAMMOGRAM: 10/25/2024      Current Outpatient Medications   Medication Sig Dispense Refill    estradiol 0.05 MG/24HR Transdermal Patch Biweekly APPLY 1 PATCH TO BIKINI REGION TWICE WEEKLY. PLEASE BE SURE TO ALTERNATE SIDES      NP THYROID 60 MG Oral Tab Take 2 tablets (120 mg total) by mouth daily. 180 tablet 1    progesterone 200 MG Oral Cap          Past Medical History:    Amenorrhea    Celiac disease (HCC)    Gluten sensitivity,  never confirmed with test    Colon polyp    Disorder of thyroid    Hemorrhoids    Thyroid disease     Past Surgical History:   Procedure Laterality Date    Colonoscopy      Colonoscopy      Colonoscopy N/A 2019    Procedure: COLONOSCOPY;  Surgeon: Walker Stokes MD;  Location: [facility]    Needle  biopsy right      benign    Patient denies any surgical history       Allergies[1]  Family History   Problem Relation Age of Onset    Cancer Father         Colon, liver    Colon Cancer Father     Other (alzheimers) Mother      Social History     Socioeconomic History    Marital status:    Tobacco Use    Smoking status: Some Days     Current packs/day: 0.00     Types: Cigarettes     Start date: 1991     Last attempt to quit: 2006     Years since quittin.8    Smokeless tobacco: Never   Vaping Use    Vaping status: Never Used   Substance and Sexual Activity    Alcohol use: Yes     Alcohol/week: 2.0 standard drinks of alcohol     Types: 2 Glasses of wine per week     Comment: 1 qd    Drug use: Yes     Types: Cannabis     Comment: Occasional marijuana     Social History     Social History Narrative    Work - rental property        2 children       ROS:     Review of Systems:  A comprehensive 10 point ROS was completed. All pertinent positives and negatives noted in the HPI        /60   Ht 66\"   Wt 164 lb 14.4 oz (74.8 kg)   LMP 2024 (Approximate)   BMI 26.62 kg/m²     Exam:   GENERAL: well developed, well nourished, in no apparent distress  SKIN: no rashes, no lesions  HEENT: normal  LUNGS: respiration unlabored  CARDIOVASCULAR: no peripheral edema or varicosities, skin warm and dry  BREASTS: bilaterally nontender, no palpable masses, no nipple discharge, no skin changes, no axillary adenopathy  ABDOMEN: Soft, non distended; non tender, no masses  GYNE/:   External Genitalia: normal, no lesions, good perineal support  Urethra: meatus normal  Bladder: well supported  Vagina: normal mucosa, no lesions, minimal discharge   Uterus: normal size, mobile, nontender  Cervix:  normal os, no lesions or bleeding  Adnexa: normal size, bilaterally nontender, no palpable masses  Cul-de-sac: normal  R/V: normal perineum, no hemorrhoids  EXTREMITIES: nontender without edema      A/P: Patient  is 54 year old female here for well-woman exam.     1. Women's annual routine gynecological examination  - Normal exam    2. Hormone replacement therapy (HRT)  - Refill done  - Follow up with [facility] as planned      Total time spent = 30 minutes  >50% = face to face discussion and coordination of care        11/19/2024  Aundrea Parisi MD                    [1] No Known Allergies

## 2024-12-03 ENCOUNTER — TELEMEDICINE (OUTPATIENT)
Dept: INTEGRATIVE MEDICINE | Facility: CLINIC | Age: 54
End: 2024-12-03
Payer: COMMERCIAL

## 2024-12-03 DIAGNOSIS — E06.3 HASHIMOTO'S THYROIDITIS: ICD-10-CM

## 2024-12-03 DIAGNOSIS — R79.89 LOW VITAMIN D LEVEL: Primary | ICD-10-CM

## 2024-12-03 DIAGNOSIS — N95.1 PERIMENOPAUSAL: ICD-10-CM

## 2024-12-03 PROCEDURE — G2211 COMPLEX E/M VISIT ADD ON: HCPCS | Performed by: PHYSICIAN ASSISTANT

## 2024-12-03 PROCEDURE — 99214 OFFICE O/P EST MOD 30 MIN: CPT | Performed by: PHYSICIAN ASSISTANT

## 2024-12-03 RX ORDER — LEVOTHYROXINE, LIOTHYRONINE 38; 9 UG/1; UG/1
120 TABLET ORAL DAILY
Qty: 180 TABLET | Refills: 1 | Status: SHIPPED | OUTPATIENT
Start: 2024-12-03

## 2024-12-03 NOTE — PROGRESS NOTES
Gladys Botello is a 54 year old female.  Chief Complaint   Patient presents with    Follow - Up     Refill        HPI:   54-year-old with known history of Hashimoto's hypothyroidism presents via video today for follow-up on thyroid for refills.    Last labs: 5/30/24    Has lost 20lbs!   Has been Int Fasting  Started low impact exercise    Thyroid -  Managed well on NP Thyroid. Denies cold intolerance or constipation or heart palpitations. No hoarse voice or dysphagia.     Perimenopause - Irreg menses. LMP 5/6/24.    Started Estrogen Patch and oral Progesterone in March through online menopause clinic- has helped with brain fog, hot/flashes and night sweats, sleeping better.   Denies vaginal dryness or breast tenderness.  Had appt with Dr. Parisi recently, she refilled the estradiol and progesterone. Asking for us to take over prescribing along with thyroid med management.  Last Mammogram 11/6/24 - wnl    Lifestyle Factors affecting health:  Diet - GF, DF, soy free. Working well on increasing protein.  Started mindful eating with Dr. Lucas Hickman  Good amount of water, reduction in alcohol   THC at times  Exercise - low Impact     Stress - high, child having psych issues,  travels for work.  Relievers - walk daily with dog with podcast    Sleep - better, less interrupted    -------------------------Last OV 5/29/2024------------------    Last labs: 10/24/23    Thyroid - brain fog, weight loss resistance. No cold intolerance or constipation. No hoarse voice or dysphagia.     Perimenopause - seems to have menses every 3mos. Started Estrogen Patch and oral Progesterone in March through online menopause clinic- has helped with hot/flashes and night sweats, sleeping better.   May start DHEA and creatine. Would like DHEA level checked to see if she should proceed with taking.   Started with .     Lifestyle Factors affecting health:  Diet - GF, DF, soy free. Working well on increasing  protein.  Started mindful eating with Dr. Lucas Hickman  Good amount of water, reduction in alcohol   THC at times  Exercise - starting with     Stress - high, child having psych issues,  travels for work.  Relievers - walk daily with dog with podcast    Sleep - better, less interrupted    Cutting back on the smoking    --------------------------------------------Last OV---------------------------------------------      Perimenopausal-April labs revealed low progesterone and patient is experiencing vasomotor symptoms and night sweats, low libido. Menses now irregular - has not had August. Cycles had been shortening, heavier than normal.  High stress in the last year.  Did not start Vitex yet.    Hashimoto's hypothyroidism-April labs revealed increased antibodies despite eating gluten-free, dairy free, and soy free.  Currently on NP thyroid 60 mg. Denies constipation, cold intolerance, hair loss.     Is complaining of fatigue and weight loss still, but very stressed. Weight increased 10lbs quickly at end of summer.     Started Ashwagnadha x3mos, not much difference.    Low vitamin D-has been taking drops but unsure how much, daily and Magnesium.     Low ferritin- did not get the iron yet.       Nicole's MultiVit - can tell when she doesn't take  Trying to cut back on smoking    REVIEW OF SYSTEMS:   Review of Systems     Negative except for pertinent ROS in HPI    FAMILY HISTORY:      Family History   Problem Relation Age of Onset    Cancer Father         Colon, liver    Colon Cancer Father     Other (alzheimers) Mother        MEDICAL HISTORY:     Past Medical History:    Amenorrhea    Celiac disease (HCC)    Gluten sensitivity,  never confirmed with test    Colon polyp    Disorder of thyroid    Hemorrhoids    Thyroid disease       CURRENT MEDICATIONS:     Current Outpatient Medications   Medication Sig Dispense Refill    NP THYROID 60 MG Oral Tab Take 2 tablets (120 mg total) by mouth daily. 180 tablet 1     ESTRADIOL 0.05 MG/24HR Transdermal Patch Biweekly Place 1 patch onto the skin twice a week. 24 patch 3    progesterone 200 MG Oral Cap Take 1 capsule (200 mg total) by mouth nightly. 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[employer]    Social History     Socioeconomic History    Marital status:    Tobacco Use    Smoking status: Some Days     Current packs/day: 0.00     Types: Cigarettes     Start date: 1991     Last attempt to quit: 2006     Years since quittin.9    Smokeless tobacco: Never   Vaping Use    Vaping status: Never Used   Substance and Sexual Activity    Alcohol use: Yes     Alcohol/week: 2.0 standard drinks of alcohol     Types: 2 Glasses of wine per week     Comment: 1 qd    Drug use: Yes     Types: Cannabis     Comment: Occasional marijuana   Other Topics Concern    Weight Concern Yes     Comment: Perimenopause   Social History Narrative    Work - rental property        2 children     Social Drivers of Health      Received [facility]    Social Connections    Received from [facility]    Housing Stability       SURGICAL HISTORY:     Past Surgical History:   Procedure Laterality Date    Colonoscopy      Colonoscopy      Colonoscopy N/A 2019    Procedure: COLONOSCOPY;  Surgeon: Walker Stokes MD;  Location: [facility]    Needle biopsy right      benign    Patient denies any surgical history         PHYSICAL EXAM:     There were no vitals filed for this visit.        Physical Exam  Vitals reviewed.   Constitutional:       General: She is not in acute distress.     Appearance: Normal appearance.   HENT:      Mouth/Throat:      Mouth: Mucous membranes are moist.      Pharynx: Oropharynx is clear.   Eyes:       Extraocular Movements: Extraocular movements intact.      Conjunctiva/sclera: Conjunctivae normal.   Neck:      Thyroid: No thyromegaly (slight).   Cardiovascular:      Rate and Rhythm: Normal rate and regular rhythm.      Heart sounds: Normal heart sounds.   Pulmonary:      Effort: Pulmonary effort is normal.      Breath sounds: Normal breath sounds.   Musculoskeletal:         General: No swelling or deformity.   Skin:     General: Skin is warm and dry.   Neurological:      General: No focal deficit present.      Mental Status: She is alert and oriented to person, place, and time.   Psychiatric:         Mood and Affect: Mood normal.          ASSESSMENT AND PLAN:     No visits with results within 6 Month(s) from this visit.   Latest known visit with results is:   [facility] Encounter on 05/30/2024   Component Date Value Ref Range Status    DHEA Sulfate 05/30/2024 189.5  25.9 - 460.2 ug/dL Final    This test may exhibit interference of greater than 10% when a sample is collected from a person who is consuming high dose of biotin (a.k.a., vitamin B7, vitamin H, coenzyme R) supplements resulting in serum concentrations &gt;12.5 ng/mL, leading to either falsely elevated or falsely depressed results.  Intake of the recommended daily allowance (RDA) for biotin (0.03 mg) has not been shown to typically cause significant interference; however, high dose daily dietary supplements may contain biotin concentrations greater than 150 times (5-10 mg) the RDA.  It is recommended that physicians ask all patients who may be on biotin supplementation to stop biotin consumption at least 72 hours prior to collection of a new sample.    Pregnenolone 05/30/2024 26  ng/dL Final    This test was developed and its performance characteristics  determined by [facility]. It has not been cleared or approved  by the Food and Drug Administration.  Reference Range:  Adults: <151    TSH 05/30/2024 0.651  0.550 - 4.780 mIU/mL Final    Free T4  05/30/2024 0.8  0.8 - 1.7 ng/dL Final    Anti-Thyroglobulin 05/30/2024 379 (H)  <60 U/mL Final    This test may exhibit interference when a sample is collected from a person who is consuming high dose of biotin (a.k.a., vitamin B7, vitamin H, coenzyme R) supplements resulting in serum concentrations >=100 ng/mL, leading to falsely depressed Thyroglobulin results (32% - 37% decrease).  Intake of the recommended daily allowance (RDA) for biotin (0.03 mg) has not been shown to typically cause significant interference; however, high dose daily dietary supplements may contain biotin concentrations greater than 150 times (5-10 mg) the RDA.  It is recommended that physicians ask all patients who may be on biotin supplementation to stop biotin consumption at least 72 hours prior to collection of a new sample.      Anti-Thyroperoxidase 05/30/2024 3,850 (H)  <60 U/mL Final    Reverse T3 05/30/2024 <5.0 (L)  9.2 - 24.1 ng/dL Final    T3 Free 05/30/2024 2.68  2.40 - 4.20 pg/mL Final    Iron 05/30/2024 129  50 - 170 ug/dL Final    Transferrin 05/30/2024 272  250 - 380 mg/dL Final    Total Iron Binding Capacity 05/30/2024 405  250 - 425 ug/dL Final    % Saturation 05/30/2024 32  15 - 50 % Final    Ferritin 05/30/2024 20.8  18.0 - 340.0 ng/mL Final    Vitamin D, 25OH, Total 05/30/2024 41.2  30.0 - 100.0 ng/mL Final    Literature Recommendations for 25(OH)D levels are:  Range           Vitamin D Status   <20    ng/mL      Deficiency   20-<30 ng/mL      Insufficiency    ng/mL      Sufficiency   >100   ng/mL      Toxicity    *Clinical controversy exists regarding optimal 25(OH)D levels. Emerging evidence links potential adverse effects to high levels, particularly >60 ng/mL.          No results found.    1. Hashimoto's thyroiditis  - NP THYROID 60 MG Oral Tab; Take 2 tablets (120 mg total) by mouth daily.  Dispense: 180 tablet; Refill: 1  - Assay, Thyroid Stim Hormone; Future  - Free T4, (Free Thyroxine); Future  - Free T3  (Triiodothryronine); Future  - Reverse T3, Serum; Future  - Progesterone; Future  - Estrogens Fractionated, Serum; Future  - Testosterone,Total and Weakly Bound w/ SHBG; Future  - Pregnenolone by MS/MS, Serum; Future  - Dehydroepiandrosterone Sulfate; Future  - Comp Metabolic Panel (14); Future    2. Low vitamin D level  - Vitamin D; Future    3. Perimenopausal  - Progesterone; Future  - Estrogens Fractionated, Serum; Future  - Testosterone,Total and Weakly Bound w/ SHBG; Future  - Pregnenolone by MS/MS, Serum; Future  - Dehydroepiandrosterone Sulfate; Future  - Comp Metabolic Panel (14); Future      This visit was conducted using Telemedicine with live, interactive video and audio.   The patient understands the risks and benefits of Telemedicine and that a Telemedicine visit limits the ability to perform a thorough physical examination which may affect objective findings related to specific symptoms and conditions which can, in turn, affect treatment.     The patient was located in the [facility] at the time of the encounter.       Time spent with patient: Over 30 minutes spent in chart review and in direct communication with patient obtaining and reviewing history, creating a unique care plan, explaining the rationale for treatment, reviewing potential SE and overall treatment plan,  documenting all clinical information in Epic. Over 50% of this time was in education, counseling and coordination of care.     Problem List Items Addressed This Visit          Endocrine and Metabolic    Hashimoto's thyroiditis    Relevant Medications    NP THYROID 60 MG Oral Tab    Other Relevant Orders    Assay, Thyroid Stim Hormone    Free T4, (Free Thyroxine)    Free T3 (Triiodothryronine)    Reverse T3, Serum    Progesterone    Estrogens Fractionated, Serum    Testosterone,Total and Weakly Bound w/ SHBG    Pregnenolone by MS/MS, Serum    Dehydroepiandrosterone Sulfate    Comp Metabolic Panel (14)     Other Visit Diagnoses        Low vitamin D level    -  Primary    Relevant Medications    NP THYROID 60 MG Oral Tab    Other Relevant Orders    Vitamin D    Perimenopausal        Relevant Orders    Progesterone    Estrogens Fractionated, Serum    Testosterone,Total and Weakly Bound w/ SHBG    Pregnenolone by MS/MS, Serum    Dehydroepiandrosterone Sulfate    Comp Metabolic Panel (14)            Endocrine:  Perimenopausal -LMP May 2024  Significant improvement in vasomotor symptoms, metabolism, and sleep with initiation of BHRT in March.  Last Pap with Dr. Parisi 2 weeks ago normal, and she refilled her hormones for the next 3 months.  Patient requesting we take over management from that point on.  Last mammogram wnl last month.  -> Recheck hormone levels at this time    Hashimoto's -stable on NP thyroid.   -> Refilled today    Low Vit D -will check levels     Given further recommendations as below    Orders Placed This Visit:  Orders Placed This Encounter   Procedures    Assay, Thyroid Stim Hormone    Free T4, (Free Thyroxine)    Free T3 (Triiodothryronine)    Reverse T3, Serum    Vitamin D    Progesterone    Estrogens Fractionated, Serum    Testosterone,Total and Weakly Bound w/ SHBG    Pregnenolone by MS/MS, Serum    Dehydroepiandrosterone Sulfate    Comp Metabolic Panel (14)     No orders of the defined types were placed in this encounter.      There are no Patient Instructions on file for this visit.    Return in about 6 months (around 6/3/2025) for x60min - thyroid and BHRT.    Patient affirmed understanding of plan and all questions were answered.     Maya Allison PA-C

## 2025-01-08 ENCOUNTER — LAB ENCOUNTER (OUTPATIENT)
Dept: LAB | Age: 55
End: 2025-01-08
Attending: PHYSICIAN ASSISTANT
Payer: COMMERCIAL

## 2025-01-08 ENCOUNTER — TELEPHONE (OUTPATIENT)
Facility: CLINIC | Age: 55
End: 2025-01-08

## 2025-01-08 DIAGNOSIS — E06.3 HASHIMOTO'S THYROIDITIS: ICD-10-CM

## 2025-01-08 DIAGNOSIS — R79.89 LOW VITAMIN D LEVEL: ICD-10-CM

## 2025-01-08 DIAGNOSIS — N95.1 PERIMENOPAUSAL: ICD-10-CM

## 2025-01-08 LAB
ALBUMIN SERPL-MCNC: 4.5 G/DL (ref 3.2–4.8)
ALBUMIN/GLOB SERPL: 1.7 {RATIO} (ref 1–2)
ALP LIVER SERPL-CCNC: 55 U/L
ALT SERPL-CCNC: 12 U/L
ANION GAP SERPL CALC-SCNC: 8 MMOL/L (ref 0–18)
AST SERPL-CCNC: 21 U/L (ref ?–34)
BILIRUB SERPL-MCNC: 0.6 MG/DL (ref 0.3–1.2)
BUN BLD-MCNC: 20 MG/DL (ref 9–23)
BUN/CREAT SERPL: 22.7 (ref 10–20)
CALCIUM BLD-MCNC: 9.9 MG/DL (ref 8.7–10.4)
CHLORIDE SERPL-SCNC: 108 MMOL/L (ref 98–112)
CO2 SERPL-SCNC: 24 MMOL/L (ref 21–32)
CREAT BLD-MCNC: 0.88 MG/DL
DHEA-S SERPL-MCNC: 197.7 UG/DL
EGFRCR SERPLBLD CKD-EPI 2021: 78 ML/MIN/1.73M2 (ref 60–?)
FASTING STATUS PATIENT QL REPORTED: NO
GLOBULIN PLAS-MCNC: 2.6 G/DL (ref 2–3.5)
GLUCOSE BLD-MCNC: 100 MG/DL (ref 70–99)
OSMOLALITY SERPL CALC.SUM OF ELEC: 293 MOSM/KG (ref 275–295)
POTASSIUM SERPL-SCNC: 4 MMOL/L (ref 3.5–5.1)
PROGEST SERPL-MCNC: 21.17 NG/ML
PROT SERPL-MCNC: 7.1 G/DL (ref 5.7–8.2)
SODIUM SERPL-SCNC: 140 MMOL/L (ref 136–145)
T3FREE SERPL-MCNC: 3.06 PG/ML (ref 2.4–4.2)
T4 FREE SERPL-MCNC: 0.9 NG/DL (ref 0.8–1.7)
TSI SER-ACNC: 1.7 UIU/ML (ref 0.55–4.78)
VIT D+METAB SERPL-MCNC: 31.7 NG/ML (ref 30–100)

## 2025-01-08 PROCEDURE — 84481 FREE ASSAY (FT-3): CPT | Performed by: PHYSICIAN ASSISTANT

## 2025-01-08 PROCEDURE — 84482 T3 REVERSE: CPT | Performed by: PHYSICIAN ASSISTANT

## 2025-01-08 PROCEDURE — 82671 ASSAY OF ESTROGENS: CPT | Performed by: PHYSICIAN ASSISTANT

## 2025-01-08 PROCEDURE — 84443 ASSAY THYROID STIM HORMONE: CPT | Performed by: PHYSICIAN ASSISTANT

## 2025-01-08 PROCEDURE — 82627 DEHYDROEPIANDROSTERONE: CPT | Performed by: PHYSICIAN ASSISTANT

## 2025-01-08 PROCEDURE — 80053 COMPREHEN METABOLIC PANEL: CPT | Performed by: PHYSICIAN ASSISTANT

## 2025-01-08 PROCEDURE — 84410 TESTOSTERONE BIOAVAILABLE: CPT | Performed by: PHYSICIAN ASSISTANT

## 2025-01-08 PROCEDURE — 84144 ASSAY OF PROGESTERONE: CPT | Performed by: PHYSICIAN ASSISTANT

## 2025-01-08 PROCEDURE — 82306 VITAMIN D 25 HYDROXY: CPT | Performed by: PHYSICIAN ASSISTANT

## 2025-01-08 PROCEDURE — 84140 ASSAY OF PREGNENOLONE: CPT | Performed by: PHYSICIAN ASSISTANT

## 2025-01-08 PROCEDURE — 84439 ASSAY OF FREE THYROXINE: CPT | Performed by: PHYSICIAN ASSISTANT

## 2025-01-08 RX ORDER — UBIDECARENONE 75 MG
250 CAPSULE ORAL DAILY
COMMUNITY

## 2025-01-08 RX ORDER — MULTIVIT-MIN/IRON FUM/FOLIC AC 7.5 MG-4
1 TABLET ORAL DAILY
COMMUNITY

## 2025-01-08 NOTE — TELEPHONE ENCOUNTER
Patient is requesting for the preparation instructions please [facility] to [facility]  and patient also has additional questions about the procedure please call

## 2025-01-08 NOTE — TELEPHONE ENCOUNTER
Called patient - went over prep instructions and explained I will resend them through [facility].    No further questions at this time.

## 2025-01-12 LAB — REVERSE T3: 10.6 NG/DL

## 2025-01-13 LAB — PREGNENOLONE: 50 NG/DL

## 2025-01-14 ENCOUNTER — ANESTHESIA EVENT (OUTPATIENT)
Dept: ENDOSCOPY | Age: 55
End: 2025-01-14
Payer: COMMERCIAL

## 2025-01-14 ENCOUNTER — ANESTHESIA (OUTPATIENT)
Dept: ENDOSCOPY | Age: 55
End: 2025-01-14
Payer: COMMERCIAL

## 2025-01-14 ENCOUNTER — HOSPITAL ENCOUNTER (OUTPATIENT)
Age: 55
Setting detail: HOSPITAL OUTPATIENT SURGERY
Discharge: HOME | End: 2025-01-14
Attending: INTERNAL MEDICINE | Admitting: INTERNAL MEDICINE
Payer: COMMERCIAL

## 2025-01-14 VITALS
RESPIRATION RATE: 14 BRPM | TEMPERATURE: 98 F | SYSTOLIC BLOOD PRESSURE: 125 MMHG | HEART RATE: 61 BPM | DIASTOLIC BLOOD PRESSURE: 78 MMHG | OXYGEN SATURATION: 100 % | HEIGHT: 66 IN | BODY MASS INDEX: 25.71 KG/M2 | WEIGHT: 160 LBS

## 2025-01-14 DIAGNOSIS — Z86.0100 HISTORY OF COLON POLYPS: ICD-10-CM

## 2025-01-14 DIAGNOSIS — Z12.11 COLON CANCER SCREENING: ICD-10-CM

## 2025-01-14 PROBLEM — Z80.0 FAMILY HISTORY OF COLON CANCER: Status: ACTIVE | Noted: 2025-01-14

## 2025-01-14 LAB
ESTRADIOL: 26.5 PG/ML
ESTRONE: 80 PG/ML

## 2025-01-14 PROCEDURE — 45380 COLONOSCOPY AND BIOPSY: CPT | Performed by: INTERNAL MEDICINE

## 2025-01-14 PROCEDURE — 99070 SPECIAL SUPPLIES PHYS/QHP: CPT | Performed by: INTERNAL MEDICINE

## 2025-01-14 PROCEDURE — 88305 TISSUE EXAM BY PATHOLOGIST: CPT | Performed by: INTERNAL MEDICINE

## 2025-01-14 PROCEDURE — 45385 COLONOSCOPY W/LESION REMOVAL: CPT | Performed by: INTERNAL MEDICINE

## 2025-01-14 DEVICE — REPLAY HEMOSTASIS CLIP, 11MM SPAN
Type: IMPLANTABLE DEVICE | Status: FUNCTIONAL
Brand: REPLAY

## 2025-01-14 RX ORDER — SODIUM CHLORIDE, SODIUM LACTATE, POTASSIUM CHLORIDE, CALCIUM CHLORIDE 600; 310; 30; 20 MG/100ML; MG/100ML; MG/100ML; MG/100ML
INJECTION, SOLUTION INTRAVENOUS CONTINUOUS
OUTPATIENT
Start: 2025-01-14

## 2025-01-14 RX ORDER — NALOXONE HYDROCHLORIDE 0.4 MG/ML
0.08 INJECTION, SOLUTION INTRAMUSCULAR; INTRAVENOUS; SUBCUTANEOUS ONCE AS NEEDED
OUTPATIENT
Start: 2025-01-14 | End: 2025-01-14

## 2025-01-14 RX ORDER — SODIUM CHLORIDE, SODIUM LACTATE, POTASSIUM CHLORIDE, CALCIUM CHLORIDE 600; 310; 30; 20 MG/100ML; MG/100ML; MG/100ML; MG/100ML
INJECTION, SOLUTION INTRAVENOUS CONTINUOUS
Status: DISCONTINUED | OUTPATIENT
Start: 2025-01-14 | End: 2025-01-14

## 2025-01-14 RX ADMIN — SODIUM CHLORIDE, SODIUM LACTATE, POTASSIUM CHLORIDE, CALCIUM CHLORIDE: 600; 310; 30; 20 INJECTION, SOLUTION INTRAVENOUS at 08:59:00

## 2025-01-14 NOTE — H&P
History & Physical Examination    Patient Name: Gladys Botello  MRN: [id number]  CSN: [id number]  YOB: 1970    Diagnosis:   CRC screening  FH positive  Hx colon polyps       Prescriptions Prior to Admission[1]  Current Facility-Administered Medications   Medication Dose Route Frequency    lactated ringers infusion   Intravenous Continuous       Allergies: Allergies[2]    Past Medical History:    Amenorrhea    Celiac disease (HCC)    Gluten sensitivity,  never confirmed with test    Colon polyp    Disorder of thyroid    Hemorrhoids    Thyroid disease    Visual impairment    CONTACTS     Past Surgical History:   Procedure Laterality Date    Colonoscopy      Colonoscopy      Colonoscopy N/A 2019    Procedure: COLONOSCOPY;  Surgeon: Walker Stokes MD;  Location: [facility]    Needle biopsy right      benign    Patient denies any surgical history       Family History   Problem Relation Age of Onset    Cancer Father         Colon, liver    Colon Cancer Father     Other (alzheimers) Mother      Social History     Tobacco Use    Smoking status: Some Days     Current packs/day: 0.00     Types: Cigarettes     Start date: 1991     Last attempt to quit: 2006     Years since quittin.0    Smokeless tobacco: Never   Substance Use Topics    Alcohol use: Yes     Alcohol/week: 2.0 standard drinks of alcohol     Types: 2 Glasses of wine per week     Comment: 1 qd       SYSTEM Check if Review is Normal Check if Physical Exam is Normal If not normal, please explain:   HEENT [x ] [ x]    NECK & BACK [x ] [x ]    HEART [x ] [ x]    LUNGS [x ] [ x]    ABDOMEN [x ] [x ]    UROGENITAL [ ] [ ]    EXTREMITIES [x ] [x ]    OTHER        [ x ] I have discussed the risks and benefits and alternatives with the patient/family.  They understand and agree to proceed with plan of care.  [ x ] I have reviewed the History and Physical done within the last 30 days.  Any changes noted above.    Walker Stokes,  MD  1/14/2025  8:51 AM         [1]   Medications Prior to Admission   Medication Sig Dispense Refill Last Dose/Taking    Multiple Vitamins-Minerals (MULTI-VITAMIN/MINERALS) Oral Tab Take 1 tablet by mouth daily.   Taking    Cholecalciferol (VITAMIN D3) 25 MCG (1000 UT) Oral Cap Take 1 tablet by mouth daily.   Taking    cyanocobalamin 100 MCG Oral Tab Take 2.5 tablets (250 mcg total) by mouth daily.   Taking    NP THYROID 60 MG Oral Tab Take 2 tablets (120 mg total) by mouth daily. 180 tablet 1 1/13/2025    ESTRADIOL 0.05 MG/24HR Transdermal Patch Biweekly Place 1 patch onto the skin twice a week. 24 patch 3 Taking    progesterone 200 MG Oral Cap Take 1 capsule (200 mg total) by mouth nightly. 90 capsule 3 1/13/2025   [2] No Known Allergies

## 2025-01-14 NOTE — DISCHARGE INSTRUCTIONS
Home Care Instructions for Colonoscopy with Sedation    Diet:  - Resume your regular diet as tolerated unless otherwise instructed.  - Start with light meals to minimize bloating.  - Do not drink alcohol today.    Medication:  - If you have questions about resuming your normal medications, please contact your Primary Care Physician.    Activities:  - Take it easy today. Do not return to work today.  - Do not drive today.  - Do not operate any machinery today (including kitchen equipment).    Colonoscopy:  - You may notice some rectal \"spotting\" (a little blood on the toilet tissue) for a day or two after the exam. This is normal.  - If you experience any rectal bleeding (not spotting), persistent tenderness or sharp severe abdominal pains, oral temperature over 100 degrees Fahrenheit, light-headedness or dizziness, or any other problems, contact your doctor.    **If unable to reach your doctor, please go to the [facility] Emergency Room**    - Your referring physician will receive a full report of your examination.  - If you do not hear from your doctor's office within two weeks of your biopsy, please call them for your results.    You may be able to see your laboratory results in Melty between 4 and 7 business days.  In some cases, your physician may not have viewed the results before they are released to Melty.  If you have questions regarding your results contact the physician who ordered the test/exam by phone or via Melty by choosing \"Ask a Medical Question.\"

## 2025-01-14 NOTE — OPERATIVE REPORT
[facility] Endoscopy Report  Date of procedure-January 14, 2025    Preoperative Diagnosis:  -Colorectal cancer screening  -History colon polyps  -Family history colon cancer    Postoperative Diagnosis:  -Colon polyps x 5  -Small internal hemorrhoids    Procedure:    Colonoscopy     Surgeon:  Walker Stokes M.D.    Anesthesia:  MAC     Technique:  After informed consent, the patient was placed in the left lateral recumbent position.  Digital rectal examination revealed no palpable intraluminal abnormalities.  An Olympus variable stiffness 190 series HD colonoscope was inserted into the rectum and advanced under direct vision by following the lumen to the cecum.  The colon was examined upon withdrawal in the left lateral position.  The procedure was well tolerated without immediate complication.      Findings:  The preparation of the colon was good.  The terminal ileum was examined for 4 cm and visually normal.  The ileocecal valve was well preserved. The visualized colonic mucosa from the cecum to the anal verge was normal with an intact vascular pattern.    Colon polyps x 5 removed as follows;  -Cecum x 1, sessile 4 mm in size and cold snare removed.  -Ascending x 1, diminutive removed by cold forceps technique.  -Sigmoid x 3, the first polyp was sessile approximately 7 mm in size and cold snare removed.  Endo Clip x 1 placed across the mucosal defect.  Two other diminutive polyps removed from the segment as well.  All polypectomy sites inspected and found to be free of bleeding and specimens received and sent for analysis.    Small internal hemorrhoids noted on retroflexed view.    Estimated blood loss-insignificant  Specimens-see above    Impression:  -Colon polyps x 5  -Small internal hemorrhoids    Recommendations:  - Post polypectomy instructions given  - Repeat colonoscopy in 3- 5 years  - Symptomatic treatment of hemorrhoids          Walker Stokes MD  1/14/2025  4:54 PM

## 2025-01-14 NOTE — ANESTHESIA PREPROCEDURE EVALUATION
Anesthesia PreOp Note    HPI:     Gladys Botello is a 54 year old female who presents for preoperative consultation requested by: Walker Stokes MD    Date of Surgery: 2025    Procedure(s):  COLONOSCOPY  Indication: Colon cancer screening /History of colon polyps    Relevant Problems   No relevant active problems       NPO:  Last Liquid Consumption Date: 25  Last Liquid Consumption Time: 0400  Last Solid Consumption Date: 25  Last Solid Consumption Time: 1000  Last Liquid Consumption Date: 25          History Review:  Patient Active Problem List    Diagnosis Date Noted    Hormone replacement therapy (HRT) 2024    Women's annual routine gynecological examination 2022    Hot flashes 2022    Chronic pain of right knee 2021    Thyromegaly 2018    Hashimoto's thyroiditis 10/02/2018    History of colon polyps 2017    Hypothyroidism 10/26/2011       Past Medical History:    Amenorrhea    Celiac disease (HCC)    Gluten sensitivity,  never confirmed with test    Colon polyp    Disorder of thyroid    Hemorrhoids    Thyroid disease    Visual impairment    CONTACTS       Past Surgical History:   Procedure Laterality Date    Colonoscopy      Colonoscopy      Colonoscopy N/A 2019    Procedure: COLONOSCOPY;  Surgeon: Walkre Stokes MD;  Location: [facility] ENDO    Needle biopsy right      benign    Patient denies any surgical history         Prescriptions Prior to Admission[1]  Current Medications and Prescriptions Ordered in Epic[2]    Allergies[3]    Family History   Problem Relation Age of Onset    Cancer Father         Colon, liver    Colon Cancer Father     Other (alzheimers) Mother      Social History     Socioeconomic History    Marital status:    Tobacco Use    Smoking status: Some Days     Current packs/day: 0.00     Types: Cigarettes     Start date: 1991     Last attempt to quit: 2006     Years since quittin.0    Smokeless  tobacco: Never   Vaping Use    Vaping status: Never Used   Substance and Sexual Activity    Alcohol use: Yes     Alcohol/week: 2.0 standard drinks of alcohol     Types: 2 Glasses of wine per week     Comment: 1 qd    Drug use: Yes     Types: Cannabis     Comment: Occasional marijuana   Other Topics Concern    Weight Concern Yes     Comment: Perimenopause       Available pre-op labs reviewed.     Lab Results   Component Value Date     01/08/2025    K 4.0 01/08/2025     01/08/2025    CO2 24.0 01/08/2025    BUN 20 01/08/2025    CREATSERUM 0.88 01/08/2025     (H) 01/08/2025    CA 9.9 01/08/2025          Vital Signs:  Body mass index is 25.82 kg/m².   height is 1.676 m (5' 6\") and weight is 72.6 kg (160 lb). Her blood pressure is 106/72 and her pulse is 64. Her respiration is 12 and oxygen saturation is 98%.   Vitals:    01/08/25 1001 01/14/25 0749   BP:  106/72   Pulse:  64   Resp:  12   SpO2:  98%   Weight: 72.6 kg (160 lb)    Height: 1.676 m (5' 6\")         Anesthesia Evaluation     Patient summary reviewed and Nursing notes reviewed    Airway   Mallampati: II  TM distance: >3 FB  Dental      Pulmonary - negative ROS and normal exam   Cardiovascular - negative ROS and normal exam    Neuro/Psych - negative ROS     GI/Hepatic/Renal      Endo/Other    (+) hypothyroidism  Abdominal                  Anesthesia Plan:   ASA:  2  Plan:   MAC  Post-op Pain Management: IV analgesics  Informed Consent Plan and Risks Discussed With:  Patient  Discussed plan with:  Surgeon      I have informed Gladys Botello and/or legal guardian or family member of the nature of the anesthetic plan, benefits, risks including possible dental damage if relevant, major complications, and any alternative forms of anesthetic management.   All of the patient's questions were answered to the best of my ability. The patient desires the anesthetic management as planned.  Oj Melchor MD  1/14/2025 8:45 AM  Present on  Admission:  **None**           [1]   Medications Prior to Admission   Medication Sig Dispense Refill Last Dose/Taking    Multiple Vitamins-Minerals (MULTI-VITAMIN/MINERALS) Oral Tab Take 1 tablet by mouth daily.   Taking    Cholecalciferol (VITAMIN D3) 25 MCG (1000 UT) Oral Cap Take 1 tablet by mouth daily.   Taking    cyanocobalamin 100 MCG Oral Tab Take 2.5 tablets (250 mcg total) by mouth daily.   Taking    NP THYROID 60 MG Oral Tab Take 2 tablets (120 mg total) by mouth daily. 180 tablet 1 1/13/2025    ESTRADIOL 0.05 MG/24HR Transdermal Patch Biweekly Place 1 patch onto the skin twice a week. 24 patch 3 Taking    progesterone 200 MG Oral Cap Take 1 capsule (200 mg total) by mouth nightly. 90 capsule 3 1/13/2025   [2]   Current Facility-Administered Medications Ordered in Epic   Medication Dose Route Frequency Provider Last Rate Last Admin    lactated ringers infusion   Intravenous Continuous Walker Stokes MD         No current [facility]-ordered outpatient medications on file.   [3] No Known Allergies

## 2025-01-14 NOTE — ANESTHESIA POSTPROCEDURE EVALUATION
Patient: Gladys Botello    Procedure Summary       Date: 01/14/25 Room / Location: [facility] ENDOSCOPY 01 / [facility] ENDO    Anesthesia Start: 0901 Anesthesia Stop:     Procedure: COLONOSCOPY Diagnosis:       Colon cancer screening      History of colon polyps      (polyps and hemorrhoids)    Surgeons: Walker Stokes MD Anesthesiologist: Oj Melchor MD    Anesthesia Type: MAC ASA Status: 2            Anesthesia Type: No value filed.    Vitals Value Taken Time   /54 01/14/25 0935   Temp 97.6 °F (36.4 °C) 01/14/25 0935   Pulse 69 01/14/25 0935   Resp 15 01/14/25 0935   SpO2 98 % 01/14/25 0935       [facility] AN Post Evaluation:   Patient Evaluated in PACU  Patient Participation: complete - patient participated  Level of Consciousness: awake  Pain Management: adequate  Airway Patency:patent  Dental exam unchanged from preop  Yes    Cardiovascular Status: acceptable  Respiratory Status: acceptable  Postoperative Hydration acceptable      Oj Melchor MD  1/14/2025 9:35 AM

## 2025-01-15 ENCOUNTER — TELEPHONE (OUTPATIENT)
Facility: CLINIC | Age: 55
End: 2025-01-15

## 2025-01-15 LAB
SEX HORM BIND GLOB: 68.6 NMOL/L
TESTOST % FREE+WEAK BND: 12 %
TESTOST FREE+WEAK BND: 1.1 NG/DL
TESTOSTERONE TOT /MS: 9.4 NG/DL

## 2025-01-15 NOTE — TELEPHONE ENCOUNTER
----- Message from Walker Stokes sent at 1/15/2025 12:37 PM CST -----  I wanted to get back to you with your colonoscopy results.  You had 5 colon polyps removed which were benign.  I would advise a repeat colonoscopy in 3 years to make sure no new polyps are forming.      You also have internal hemorrhoids.  Please call with any questions.

## 2025-01-15 NOTE — TELEPHONE ENCOUNTER
Health Maintenance Updated.    3 year colonoscopy recall entered into patient outreach in [facility].  Next colonoscopy will be due 1/14/2028.    Patient viewed below result note in MyChart:  Seen by patient Gladys Botello on 1/15/2025 12:38 PM

## (undated) DEVICE — LINE MNTR ADLT SET O2 INTMD

## (undated) DEVICE — Device: Brand: DEFENDO AIR/WATER/SUCTION AND BIOPSY VALVE

## (undated) DEVICE — KIT ENDO ORCAPOD 160/180/190

## (undated) DEVICE — Device: Brand: CUSTOM PROCEDURE KIT

## (undated) DEVICE — FORCEP RADIAL JAW 4

## (undated) DEVICE — GIJAW SINGLE-USE BIOPSY FORCEPS WITH NEEDLE: Brand: GIJAW

## (undated) DEVICE — V2 SPECIMEN COLLECTION TRAY: Brand: NEPTUNE

## (undated) DEVICE — V2 SPECIMEN COLLECTION MANIFOLD KIT: Brand: NEPTUNE

## (undated) DEVICE — MEDI-VAC NON-CONDUCTIVE SUCTION TUBING 6MM X 1.8M (6FT.) L: Brand: CARDINAL HEALTH

## (undated) DEVICE — KIT CLEAN ENDOKIT 1.1OZ GOWNX2

## (undated) DEVICE — Device

## (undated) DEVICE — 60 ML SYRINGE REGULAR TIP: Brand: MONOJECT

## (undated) DEVICE — LASSO POLYPECTOMY SNARE: Brand: LASSO

## (undated) NOTE — LETTER
[facility]  [address], [place], [place]    Authorization for Surgical Operation and Procedure                               I hereby authorize Walker Stokes MD, my physician and his/her assistants (if applicable), which may include medical students, residents, and/or fellows, to perform the following surgical operation/ procedure and administer such anesthesia as may be determined necessary by my physician: Operation/Procedure name (s) COLONOSCOPY on Gladys Botello   2.   I recognize that during the surgical operation/procedure, unforeseen conditions may necessitate additional or different procedures than those listed above.  I, therefore, further authorize and request that the above-named surgeon, assistants, or designees perform such procedures as are, in their judgment, necessary and desirable.    3.   My surgeon/physician has discussed prior to my surgery the potential benefits, risks and side effects of this procedure; the likelihood of achieving goals; and potential problems that might occur during recuperation.  They also discussed reasonable alternatives to the procedure, including risks, benefits, and side effects related to the alternatives and risks related to not receiving this procedure.  I have had all my questions answered and I acknowledge that no guarantee has been made as to the result that may be obtained.    4.   Should the need arise during my operation/procedure, which includes change of level of care prior to discharge, I also consent to the administration of blood and/or blood products.  Further, I understand that despite careful testing and screening of blood or blood products by collecting agencies, I may still be subject to ill effects as a result of receiving a blood transfusion and/or blood products.  The following are some, but not all, of the potential risks that can occur: fever and allergic reactions, hemolytic reactions, transmission of diseases such  as Hepatitis, AIDS and Cytomegalovirus (CMV) and fluid overload.  In the event that I wish to have an autologous transfusion of my own blood, or a directed donor transfusion, I will discuss this with my physician.  Check only if Refusing Blood or Blood Products  I understand refusal of blood or blood products as deemed necessary by my physician may have serious consequences to my condition to include possible death. I hereby assume responsibility for my refusal and release the hospital, its personnel, and my physicians from any responsibility for the consequences of my refusal.    o  Refuse   5.   I authorize the use of any specimen, organs, tissues, body parts or foreign objects that may be removed from my body during the operation/procedure for diagnosis, research or teaching purposes and their subsequent disposal by hospital authorities.  I also authorize the release of specimen test results and/or written reports to my treating physician on the hospital medical staff or other referring or consulting physicians involved in my care, at the discretion of the Pathologist or my treating physician.    6.   I consent to the photographing or videotaping of the operations or procedures to be performed, including appropriate portions of my body for medical, scientific, or educational purposes, provided my identity is not revealed by the pictures or by descriptive texts accompanying them.  If the procedure has been photographed/videotaped, the surgeon will obtain the original picture, image, videotape or CD.  The hospital will not be responsible for storage, release or maintenance of the picture, image, tape or CD.    7.   I consent to the presence of a  or observers in the operating room as deemed necessary by my physician or their designees.    8.   I recognize that in the event my procedure results in extended X-Ray/fluoroscopy time, I may develop a skin reaction.    9. If I have a Do Not Attempt  Resuscitation (DNAR) order in place, that status will be suspended while in the operating room, procedural suite, and during the recovery period unless otherwise explicitly stated by me (or a person authorized to consent on my behalf). The surgeon or my attending physician will determine when the applicable recovery period ends for purposes of reinstating the DNAR order.  10. Patients having a sterilization procedure: I understand that if the procedure is successful the results will be permanent and it will therefore be impossible for me to inseminate, conceive, or bear children.  I also understand that the procedure is intended to result in sterility, although the result has not been guaranteed.   11. I acknowledge that my physician has explained sedation/analgesia administration to me including the risk and benefits I consent to the administration of sedation/analgesia as may be necessary or desirable in the judgment of my physician.    I CERTIFY THAT I HAVE READ AND FULLY UNDERSTAND THE ABOVE CONSENT TO OPERATION and/or OTHER PROCEDURE.     ____________________________________  _________________________________        ______________________________  Signature of Patient    Signature of Responsible Person                Printed Name of Responsible Person                                      ____________________________________  _____________________________                ________________________________  Signature of Witness        Date  Time         Relationship to Patient    STATEMENT OF PHYSICIAN My signature below affirms that prior to the time of the procedure; I have explained to the patient and/or his/her legal representative, the risks and benefits involved in the proposed treatment and any reasonable alternative to the proposed treatment. I have also explained the risks and benefits involved in refusal of the proposed treatment and alternatives to the proposed treatment and have answered the patient's  questions. If I have a significant financial interest in a co-management agreement or a significant financial interest in any product or implant, or other significant relationship used in this procedure/surgery, I have disclosed this and had a discussion with my patient.     _____________________________________________________              _____________________________  (Signature of Physician)                                                                                         (Date)                                   (Time)  Patient Name: Gladys Botello      : 1970      Printed: 2025     Medical Record #: [id number]                                      Page 1 of 1

## (undated) NOTE — LETTER
07/25/20        [address] [place] [address] [address] [address]      Dear Michela Rios,    [facility] records indicate that you have outstanding lab work and or testing that was ordered for you and has not yet been completed:  Orders Placed This Encoun

## (undated) NOTE — LETTER
19      Patient: Kindrafaraz Knoxclyde [facility]  : 1970    Order: Bilateral Screening mammogram    ICD 10: Z12.31    CPT: 41372        Mabel Gregg PA-C

## (undated) NOTE — LETTER
6/13/2024    Gladys Botello        [address] [facility] [address]            Dear Gladys Botello,      Our records indicate that you are due for an appointment for a Colonoscopy with Walker Stokes MD. Our doctors are booking out about 3-6 months in advance for procedures.     Please call our office to schedule a phone screening appointment to plan for the procedure(s).   Your medical well-being is important to us.    If your insurance requires a referral, please call your primary care office to request one.      Thank you,      The Physicians and Staff at [facility]

## (undated) NOTE — LETTER
[facility]  [address], [place], [place]       INFORMED REFUSAL FOR TREATMENT / PROCEDURE / TESTING      I have been advised by  ____________Lynch__________________ that it is necessary for me to undergo the following treatment, procedure or testing.  The treatment, procedure or test is as follows:    _____________________________________________________________________    Urine Pregnancy Test   _____________________________________________________________________    The need for this treatment, procedures and/or testing, its benefits and risks, and alternatives has been explained to me by my physician.  I understand that my refusal to consent to the recommended medical treatment or testing may seriously impair my health and even jeopardize my life.  While I understand the possible consequences, I nevertheless refuse to submit to the recommended treatment, procedure or testing against medical advice.  I assume responsibility for the consequences of this refusal and release [facility], [facility] ([facility]), it affiliates and subsidiaries, its employees and agents, and the physician, from any and all liability associated with my refusal to follow the medical advice and permit treatment, procedure or testing.          ________________________________________  (Patient Signature)      Gladys Botello  (Patient Name, Printed)    ________________________________________  (Date)                  Patient Name: Gladys Botello     : 1970                 Printed: 2025      Medical Record #: [id number]                                              Page 1 of 1

## (undated) NOTE — LETTER
[facility]  Administration of Anesthesia  I, Gladys Botello agree to be cared for by a physician anesthesiologist alone and/or with a nurse anesthetist, who is specially trained to monitor me and give me medicine to put me to sleep or keep me comfortable during my procedure    I understand that my anesthesiologist and/or anesthetist is not an employee or agent of [facility] or [facility]. He or she works for [facility]    As the patient asking for anesthesia services, I agree to:  Allow the anesthesiologist (anesthesia doctor) to give me medicine and do additional procedures as necessary. Some examples are: Starting or using an “IV” to give me medicine, fluids or blood during my procedure, and having a breathing tube placed to help me breathe when I’m asleep (intubation). In the event that my heart stops working properly, I understand that my anesthesiologist will make every effort to sustain my life, unless otherwise directed by [facility] Do Not Resuscitate documents.  Tell my anesthesia doctor before my procedure:  If I am pregnant.  The last time that I ate or drank.  iii. All of the medicines I take (including prescriptions, herbal supplements, and pills I can buy without a prescription (including street drugs/illegal medications). Failure to inform my anesthesiologist about these medicines may increase my risk of anesthetic complications.  iv.If I am allergic to anything or have had a reaction to anesthesia before.  I understand how the anesthesia medicine will help me (benefits).  I understand that with any type of anesthesia medicine there are risks:  The most common risks are: nausea, vomiting, sore throat, muscle soreness, damage to my eyes, mouth, or teeth (from breathing tube placement).  Rare risks include: remembering what happened during my procedure, allergic reactions to medications, injury to my airway, heart, lungs, vision, nerves, or  muscles and in extremely rare instances death.  My doctor has explained to me other choices available to me for my care (alternatives).  Pregnant Patients (“epidural”):  I understand that the risks of having an epidural (medicine given into my back to help control pain during labor), include itching, low blood pressure, difficulty urinating, headache or slowing of the baby’s heart. Very rare risks include infection, bleeding, seizure, irregular heart rhythms and nerve injury.  Regional Anesthesia (“spinal”, “epidural”, & “nerve blocks”):  I understand that rare but potential complications include headache, bleeding, infection, seizure, irregular heart rhythms, and nerve injury.    _____________________________________________________________________________  Patient (or Representative) Signature/Relationship to Patient  Date   Time    _____________________________________________________________________________   Name (if used)    Language/Organization   Time    _____________________________________________________________________________  Nurse Anesthetist Signature     Date   Time  _____________________________________________________________________________  Anesthesiologist Signature     Date   Time  I have discussed the procedure and information above with the patient (or patient’s representative) and answered their questions. The patient or their representative has agreed to have anesthesia services.    _____________________________________________________________________________  Witness        Date   Time  I have verified that the signature is that of the patient or patient’s representative, and that it was signed before the procedure  Patient Name: Gladys Botello     : 1970                 Printed: 2025 at 11:35 AM    Medical Record #: [id number]                                            Page 1 of 1  ----------ANESTHESIA CONSENT----------